# Patient Record
Sex: FEMALE | Employment: OTHER
[De-identification: names, ages, dates, MRNs, and addresses within clinical notes are randomized per-mention and may not be internally consistent; named-entity substitution may affect disease eponyms.]

---

## 2017-05-27 ENCOUNTER — HEALTH MAINTENANCE LETTER (OUTPATIENT)
Age: 57
End: 2017-05-27

## 2017-06-06 ENCOUNTER — DOCUMENTATION ONLY (OUTPATIENT)
Dept: LAB | Facility: CLINIC | Age: 57
End: 2017-06-06

## 2017-06-06 DIAGNOSIS — C50.919 BREAST CANCER (H): Primary | ICD-10-CM

## 2017-06-06 NOTE — PROGRESS NOTES
Patient is scheduled for a lab appointment on 6/14/17.  Please place future orders for labs needed.  Thank you.

## 2017-06-16 DIAGNOSIS — C50.919 BREAST CANCER (H): ICD-10-CM

## 2017-06-16 LAB
ALBUMIN SERPL-MCNC: 4.2 G/DL (ref 3.4–5)
ALP SERPL-CCNC: 59 U/L (ref 40–150)
ALT SERPL W P-5'-P-CCNC: 18 U/L (ref 0–50)
ANION GAP SERPL CALCULATED.3IONS-SCNC: 6 MMOL/L (ref 3–14)
AST SERPL W P-5'-P-CCNC: 25 U/L (ref 0–45)
BASOPHILS # BLD AUTO: 0 10E9/L (ref 0–0.2)
BASOPHILS NFR BLD AUTO: 0.2 %
BILIRUB SERPL-MCNC: 0.4 MG/DL (ref 0.2–1.3)
BUN SERPL-MCNC: 10 MG/DL (ref 7–30)
CALCIUM SERPL-MCNC: 9.7 MG/DL (ref 8.5–10.1)
CHLORIDE SERPL-SCNC: 101 MMOL/L (ref 94–109)
CO2 SERPL-SCNC: 28 MMOL/L (ref 20–32)
CREAT SERPL-MCNC: 0.8 MG/DL (ref 0.52–1.04)
DIFFERENTIAL METHOD BLD: ABNORMAL
EOSINOPHIL # BLD AUTO: 0.1 10E9/L (ref 0–0.7)
EOSINOPHIL NFR BLD AUTO: 2 %
ERYTHROCYTE [DISTWIDTH] IN BLOOD BY AUTOMATED COUNT: 12.5 % (ref 10–15)
GFR SERPL CREATININE-BSD FRML MDRD: 73 ML/MIN/1.7M2
GLUCOSE SERPL-MCNC: 103 MG/DL (ref 70–99)
HCT VFR BLD AUTO: 34.8 % (ref 35–47)
HGB BLD-MCNC: 11.7 G/DL (ref 11.7–15.7)
LYMPHOCYTES # BLD AUTO: 1.7 10E9/L (ref 0.8–5.3)
LYMPHOCYTES NFR BLD AUTO: 33.1 %
MCH RBC QN AUTO: 34.2 PG (ref 26.5–33)
MCHC RBC AUTO-ENTMCNC: 33.6 G/DL (ref 31.5–36.5)
MCV RBC AUTO: 102 FL (ref 78–100)
MONOCYTES # BLD AUTO: 0.9 10E9/L (ref 0–1.3)
MONOCYTES NFR BLD AUTO: 18.6 %
NEUTROPHILS # BLD AUTO: 2.3 10E9/L (ref 1.6–8.3)
NEUTROPHILS NFR BLD AUTO: 46.1 %
PLATELET # BLD AUTO: 290 10E9/L (ref 150–450)
POTASSIUM SERPL-SCNC: 4.3 MMOL/L (ref 3.4–5.3)
PROT SERPL-MCNC: 7.9 G/DL (ref 6.8–8.8)
RBC # BLD AUTO: 3.42 10E12/L (ref 3.8–5.2)
SODIUM SERPL-SCNC: 135 MMOL/L (ref 133–144)
WBC # BLD AUTO: 5 10E9/L (ref 4–11)

## 2017-06-16 PROCEDURE — 80053 COMPREHEN METABOLIC PANEL: CPT | Performed by: INTERNAL MEDICINE

## 2017-06-16 PROCEDURE — 86300 IMMUNOASSAY TUMOR CA 15-3: CPT | Performed by: INTERNAL MEDICINE

## 2017-06-16 PROCEDURE — 85025 COMPLETE CBC W/AUTO DIFF WBC: CPT | Performed by: INTERNAL MEDICINE

## 2017-06-16 PROCEDURE — 36415 COLL VENOUS BLD VENIPUNCTURE: CPT | Performed by: INTERNAL MEDICINE

## 2017-06-18 LAB — CANCER AG27-29 SERPL-ACNC: 21 U/ML (ref 0–39)

## 2017-06-22 ENCOUNTER — TRANSFERRED RECORDS (OUTPATIENT)
Dept: HEALTH INFORMATION MANAGEMENT | Facility: CLINIC | Age: 57
End: 2017-06-22

## 2017-07-05 ENCOUNTER — ONCOLOGY VISIT (OUTPATIENT)
Dept: ONCOLOGY | Facility: CLINIC | Age: 57
End: 2017-07-05
Attending: INTERNAL MEDICINE

## 2017-07-05 VITALS
BODY MASS INDEX: 22.35 KG/M2 | SYSTOLIC BLOOD PRESSURE: 128 MMHG | OXYGEN SATURATION: 97 % | HEART RATE: 103 BPM | DIASTOLIC BLOOD PRESSURE: 82 MMHG | TEMPERATURE: 99.4 F | RESPIRATION RATE: 18 BRPM | WEIGHT: 142.4 LBS | HEIGHT: 67 IN

## 2017-07-05 DIAGNOSIS — Z85.3 PERSONAL HISTORY OF MALIGNANT NEOPLASM OF BREAST: ICD-10-CM

## 2017-07-05 DIAGNOSIS — F17.200 TOBACCO USE DISORDER: Primary | ICD-10-CM

## 2017-07-05 DIAGNOSIS — Z78.0 MENOPAUSE: ICD-10-CM

## 2017-07-05 DIAGNOSIS — D75.89 MACROCYTOSIS: ICD-10-CM

## 2017-07-05 PROCEDURE — 99211 OFF/OP EST MAY X REQ PHY/QHP: CPT

## 2017-07-05 PROCEDURE — 99214 OFFICE O/P EST MOD 30 MIN: CPT | Performed by: INTERNAL MEDICINE

## 2017-07-05 RX ORDER — ANASTROZOLE 1 MG/1
1 TABLET ORAL DAILY
Qty: 90 TABLET | Refills: 3 | Status: SHIPPED | OUTPATIENT
Start: 2017-07-05 | End: 2018-07-19

## 2017-07-05 RX ORDER — CITALOPRAM HYDROBROMIDE 20 MG/1
20 TABLET ORAL
COMMUNITY
Start: 2017-03-30

## 2017-07-05 ASSESSMENT — PAIN SCALES - GENERAL: PAINLEVEL: NO PAIN (0)

## 2017-07-05 NOTE — PATIENT INSTRUCTIONS
Dr. Vickers would like you to have a Dexa scan and we would like to see you back in 6 months for a follow up with labs prior. Your medication have been sent to [facility]- - [address]  When you are in need of a refill, please call your pharmacy and they will send us a request.  Copy of appointments, and after visit summary (AVS) given to patient.  If you have any questions please call Vicky Wells RN, BSN Oncology Hematology  [facility] [phone]. For questions after business hours, or on holidays/weekends, please call our after hours Nurse Triage line [phone]. Thank you.           Due Dexa now.   6 months f/u with labs

## 2017-07-05 NOTE — PROGRESS NOTES
CHIEF COMPLAINT AND REASON FOR VISIT: left breast cancer , [id number] with RS of 56, s/p adjuvant DD AC, s/p RT, now on Arimidex since 2015     HISTORY OF PRESENT ILLNESS:  Gladis Wolfe is a 54-year-old postmenopausal woman diagnosed through screening mammogram with left breast infiltrating ductal cancer in 2014.  Mammogram found a 1 cm density with cluster of irregular calcification in the left breast upper lateral quatrant which is new.  Biopsy was done in mid November through ultrasound.  She subsequently underwent left lumpectomy with Dr. Davison at [facility] at the end of 2014, found infiltrating ductal cancer, grade 3 of 0.8 cm, ER 81% positive, CA 14% positive, HER-2/nancy negative, associated with grade 3 DCIS.  One sentinel node was negative.  She has pathologic T1b N0 disease.   She did not have any breast complaints.  She missed 1-year mammogram in .  Baseline mammogram in  was negative.     Staging in 2014 found no distal disease. She has F7aV6B0 disease.  RS back at 55 is very high.     DD AC x4 was advised in adjuvant setting. She made informed decision to proceed from  2015 to 3/2015. She finished RT in 2015.  She is advised on arimidex 2015.      PAST MEDICAL HISTORY:  Hypothyroidism, depression, high cholesterol.  Last menstrual period around .      MEDICATIONS:  Reviewed in Epic system.      SOCIAL HISTORY:  She lives in [place].  Her primary care physician is Dr. Leana Santamaria from [facility].  She is an active smoker, less than a pack a day for 40 years. She is back to work as . She drinks ETOH daily.      FAMILY HISTORY:  Positive for Dad, who had tongue cancer and had metastatic disease,  from that, and Mom probably has had some type of cancer.  She also had dementia.      REVIEW OF SYSTEMS:   Good appetite. Weight is up. 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   She is s/p lympedema therapy.  She continues to smoke and daily ETOH.  "      PHYSICAL EXAMINATION:   VITAL SIGNS:  Blood pressure 128/82, pulse 103, temperature 99.4  F (37.4  C), temperature source Tympanic, resp. rate 18, height 1.708 m (5' 7.25\"), weight 64.6 kg (142 lb 6.4 oz), SpO2 97 %, not currently breastfeeding.  GENERAL APPEARANCE:   Middle-aged woman, very pleasant, not in acute distress, tobacco smell.   HEENT: The patient is normocephalic, atraumatic. Pupils are equally reactive to light.  Sclerae are anicteric.  Moist oral mucosa.  Negative pharynx.  No oral thrush.   NECK:  Supple.  No jugular venous distention.  Thyroid is not palpable.   LYMPH NODES:  Superficial lymphadenopathy is not appreciable in the bilateral cervical, supraclavicular, axillary or inguinal areas.   CARDIOVASCULAR:  S1, S2 regular with no murmurs or gallops.  No carotid or abdominal bruits.   PULMONARY:  Lungs are clear to auscultation and percussion bilaterally.  There is no wheezing or rhonchi.   GASTROINTESTINAL:  Abdomen is soft, nontender.  No hepatosplenomegaly.  No signs of ascites.  No mass appreciable.   MUSCULOSKELETAL/EXTREMITIES:  No edema.  No cyanotic changes.  No signs of joint deformity.  No lymphedema.   NEUROLOGIC:  Cranial nerves II-XII are grossly intact.  Sensation intact.  Muscle strength and muscle tone symmetrical, 5/5 throughout.   BACK:  No spinal or paraspinal tenderness.  No CVA tenderness.   SKIN:  No petechiae.  No rash.  No signs of cellulitis.   BREASTS:  She has well-healed left lumpectomy scar. Left breast with fibrosed skin and protruded much comparing to right side due to lympedema.  She has fibrotic changes around the sentinel scar in her left axilla.  Right breast feels smooth and no skin changes.       Current LAB Data reviewed  6/2017 wbc 5.0, hb 11.7, , PL nl.       Current Imaging Reviewed  B/l MA 6/2017 at [facility] [place]: negative.       Old data reviewed with summary  12/2016 hb 11.5, , wbc/diff/PL are fine, cmp/marker are fine.   5/2016 hb 10.8, MCV " 104, wbc/diff/pl are nl, cmp, marker are fine    9/2015 hb 9.3, , wbc nl, pl nl, albumin 3.0 from 1.8 in 7/2015, K nl.   7/2015 HB 7.2, mcv 112, WBC 3.8, lymphopenia, PL nl, folate/B12/MMA nl, K 3.4, Mag nl.  Smear 7/2015: Anisocytosis is markedly increased with mild poikilocytosis, the latter consisting of scattered ovalocytes. Minimal polychromasia is present  and not increased. No red cell fragments, dacryocytes, or circulating nucleated red blood cell precursors are present to suspect a myelophthisic related anemic process.       RS at 55 in 12/2014.   12/2104 CBC/marker are fine.   In 10/2014 liver function tests from [facility] system are normal.      Lumpectomy at the end of 11/2014:  Infiltrating ductal cancer from the left breast, grade 3, associated high-grade DCIS, 0.8 cm.  Angiolymphatic invasion is absent.  ER 81% positive, NM 14% positive, HER-2/nancy negative.      Echo 12/2014 EF 64%  Dexa 12/2014: nl.  Cxr/bone scan 12/2014: negative  Diagnostic screening mammogram in 10/2014 found to have new cluster of microcalcification, 1.0 cm in the left breast, is new.        ASSESSMENT AND PLAN:    1.  pathologic [id number] early stage high-grade infiltrating ductal cancer with grade ductal carcinoma in situ, status post lumpectomy, 1 lymph node was negative, ER positive 85%, NM positive 14%, HER-2/nancy negative, status post lumpectomy. RS at 56 belong to very high risk.      Due to her high recurrence score of 56, recommended DD AC x 4.  She made informed decision to proceed. She finished it in 3/2015 and had RT 4/2015.     Arimidex is advised in 4/2015. We talked about the side effects and how she should be monitored during this intervention.      She is due 6 months f/u with labs. MA due in Feb.     2. Smoker. Smoking cessation is advised.     3. Persistent macrocytosis, without evidence of nutrition deficiency.   She is slowing improving. She is advised to cut down ETOH use.

## 2017-07-05 NOTE — NURSING NOTE
"Oncology Rooming Note    July 5, 2017 2:19 PM   Gladis Wolfe is a 57 year old female who presents for:    Chief Complaint   Patient presents with     Oncology Clinic Visit     6 month recheck Breast CA, review labs & Mammogram      Initial Vitals: /82 (BP Location: Right arm, Patient Position: Sitting, Cuff Size: Adult Regular)  Pulse 103  Temp 99.4  F (37.4  C) (Tympanic)  Resp 18  Ht 1.708 m (5' 7.25\")  Wt 64.6 kg (142 lb 6.4 oz)  LMP  (Within Years)  SpO2 97%  Breastfeeding? No  BMI 22.14 kg/m2 Estimated body mass index is 22.14 kg/(m^2) as calculated from the following:    Height as of this encounter: 1.708 m (5' 7.25\").    Weight as of this encounter: 64.6 kg (142 lb 6.4 oz). Body surface area is 1.75 meters squared.  No Pain (0) Comment: Data Unavailable   No LMP recorded (within years). Patient is postmenopausal.  Allergies reviewed: Yes  Medications reviewed: Yes    Medications: MEDICATION REFILLS NEEDED TODAY. Provider was notified.  Pharmacy name entered into [employer]: [facility] - [address]    Clinical concerns 6 month recheck Breast CA, review labs & Mammogram.     7 minutes for nursing intake (face to face time)     [place] CMA              "

## 2017-07-05 NOTE — MR AVS SNAPSHOT
After Visit Summary   7/5/2017    Gladis Wolfe    MRN: [id number]           Patient Information     Date Of Birth          1960        Visit Information        Provider Department      7/5/2017 2:00 PM Elis Vickers MD [facility]        Today's Diagnoses     Tobacco use disorder    -  1    Personal history of malignant neoplasm of breast        Macrocytosis        Menopause          Care Instructions    Dr. Vickers would like you to have a Dexa scan and we would like to see you back in 6 months for a follow up with labs prior. Your medication have been sent to [facility]- - [address]  When you are in need of a refill, please call your pharmacy and they will send us a request.  Copy of appointments, and after visit summary (AVS) given to patient.  If you have any questions please call Vicky Wells RN, BSN Oncology Hematology  [facility] [phone]. For questions after business hours, or on holidays/weekends, please call our after hours Nurse Triage line [phone]. Thank you.           Due Dexa now.   6 months f/u with labs          Follow-ups after your visit        Your next 10 appointments already scheduled     Jan 04, 2018  1:00 PM CST   [facility]    [address] [address] [phone]           Patient must bring picture ID.  Patient should be prepared to give a urine specimen  Please do not eat 10-12 hours before your appointment if you are coming in fasting for labs on lipids, cholesterol, or glucose (sugar).  Pregnant women should follow their Care Team instructions. Water with medications is okay. Do not drink coffee or other fluids.   If you have concerns about taking  your medications, please ask at office or if scheduling via [employer], send a message by clicking on Secure Messaging, Message Your  Care Team.            Jan 11, 2018 10:45 AM CST   Return Visit with Elis Vickers MD   [facility] ([facility])    [facility]  [address]  [facility] [id number] [phone]              Future tests that were ordered for you today     Open Future Orders        Priority Expected Expires Ordered    DX Hip/Pelvis/Spine Routine  7/5/2018 7/5/2017    Comprehensive metabolic panel Routine 1/1/2018 2/28/2018 7/5/2017    Ca27.29  breast tumor marker Routine 1/1/2018 2/28/2018 7/5/2017            Who to contact     If you have questions or need follow up information about today's clinic visit or your schedule please contact [facility] directly at [phone].  Normal or non-critical lab and imaging results will be communicated to you by MyChart, letter or phone within 4 business days after the clinic has received the results. If you do not hear from us within 7 days, please contact the clinic through [employer] or phone. If you have a critical or abnormal lab result, we will notify you by phone as soon as possible.  Submit refill requests through [employer] or call your pharmacy and they will forward the refill request to us. Please allow 3 business days for your refill to be completed.          Additional Information About Your Visit        [employer] Information     [employer] gives you secure access to your electronic health record. If you see a primary care provider, you can also send messages to your care team and make appointments. If you have questions, please call your primary care clinic.  If you do not have a primary care provider, please call [phone] and they will assist you.        Care EveryWhere ID     This is your Care EveryWhere ID. This could be used by other organizations to access your [facility] medical records  [id number]        Your Vitals Were     Pulse Temperature Respirations Height Last Period Pulse Oximetry    103 99.4  F (37.4  C)  "(Tympanic) 18 1.708 m (5' 7.25\") (Within Years) 97%    Breastfeeding? BMI (Body Mass Index)                No 22.14 kg/m2           Blood Pressure from Last 3 Encounters:   07/05/17 128/82   12/20/16 142/71   06/15/16 143/75    Weight from Last 3 Encounters:   07/05/17 64.6 kg (142 lb 6.4 oz)   12/20/16 68 kg (149 lb 14.4 oz)   06/15/16 67.1 kg (148 lb)                 Today's Medication Changes          These changes are accurate as of: 7/5/17  3:00 PM.  If you have any questions, ask your nurse or doctor.               These medicines have changed or have updated prescriptions.        Dose/Directions    citalopram 20 MG tablet   Commonly known as:  celeXA   This may have changed:  Another medication with the same name was removed. Continue taking this medication, and follow the directions you see here.   Used for:  Personal history of malignant neoplasm of breast, Tobacco use disorder, Macrocytosis   Changed by:  Elis Vickers MD        Dose:  20 mg   Take 20 mg by mouth   Refills:  0            Where to get your medicines      These medications were sent to [facility] [id number]     Phone:  [phone]     anastrozole 1 MG tablet                Primary Care Provider Office Phone # Fax #    Leana Santamaria [phone]       [place] [id number]        Equal Access to Services     ISAAC OBRIEN AH: Andrez dwyero Soomaali, waaxda luqadaha, qaybta kaalmada adeegyada, waxay kumar walker. So [facility] [phone].    ATENCIÓN: Si habla deidre, tiene a del toro disposición servicios gratuitos de asistencia lingüística. Llame al [phone].    We comply with applicable federal civil rights laws and [place] laws. We do not discriminate on the basis of race, color, national origin, age, disability sex, sexual orientation or gender identity.            Thank you!     " Thank you for choosing [facility]  for your care. Our goal is always to provide you with excellent care. Hearing back from our patients is one way we can continue to improve our services. Please take a few minutes to complete the written survey that you may receive in the mail after your visit with us. Thank you!             Your Updated Medication List - Protect others around you: Learn how to safely use, store and throw away your medicines at www.disposemymeds.org.          This list is accurate as of: 7/5/17  3:00 PM.  Always use your most recent med list.                   Brand Name Dispense Instructions for use Diagnosis    anastrozole 1 MG tablet    ARIMIDEX    90 tablet    Take 1 tablet (1 mg) by mouth daily    Personal history of malignant neoplasm of breast, Tobacco use disorder, Macrocytosis       Calcium-Vitamin D 600-200 MG-UNIT Tabs       Breast cancer, left (H)       citalopram 20 MG tablet    celeXA     Take 20 mg by mouth    Personal history of malignant neoplasm of breast, Tobacco use disorder, Macrocytosis       levothyroxine 75 MCG tablet    SYNTHROID/LEVOTHROID     Take 75 mcg by mouth    Breast cancer, left (H)       MULTI-VITAMINS Tabs       Breast cancer, left (H)       OMEGA-3 FISH OIL PO      Take 1 g by mouth daily    Breast cancer, left (H)       potassium chloride 10 MEQ tablet    K-TAB,KLOR-CON    120 tablet    Take 2 tablets (20 mEq) by mouth 2 times daily    Hypokalemia       pravastatin 20 MG tablet    PRAVACHOL     Take 20 mg by mouth    Breast cancer, left (H)       saccharomyces boulardii 250 MG capsule    FLORASTOR     Take 250 mg by mouth 2 times daily    Breast cancer, left (H)       vitamin B complex with vitamin C Tabs tablet      Take 1 tablet by mouth daily

## 2018-01-12 DIAGNOSIS — F17.200 TOBACCO USE DISORDER: ICD-10-CM

## 2018-01-12 DIAGNOSIS — D75.89 MACROCYTOSIS: ICD-10-CM

## 2018-01-12 DIAGNOSIS — Z85.3 PERSONAL HISTORY OF MALIGNANT NEOPLASM OF BREAST: ICD-10-CM

## 2018-01-12 PROCEDURE — 86300 IMMUNOASSAY TUMOR CA 15-3: CPT | Performed by: INTERNAL MEDICINE

## 2018-01-12 PROCEDURE — 36415 COLL VENOUS BLD VENIPUNCTURE: CPT | Performed by: INTERNAL MEDICINE

## 2018-01-12 PROCEDURE — 80053 COMPREHEN METABOLIC PANEL: CPT | Performed by: INTERNAL MEDICINE

## 2018-01-13 LAB
ALBUMIN SERPL-MCNC: 4.4 G/DL (ref 3.4–5)
ALP SERPL-CCNC: 62 U/L (ref 40–150)
ALT SERPL W P-5'-P-CCNC: 24 U/L (ref 0–50)
ANION GAP SERPL CALCULATED.3IONS-SCNC: 8 MMOL/L (ref 3–14)
AST SERPL W P-5'-P-CCNC: 40 U/L (ref 0–45)
BILIRUB SERPL-MCNC: 0.4 MG/DL (ref 0.2–1.3)
BUN SERPL-MCNC: 12 MG/DL (ref 7–30)
CALCIUM SERPL-MCNC: 9.4 MG/DL (ref 8.5–10.1)
CANCER AG27-29 SERPL-ACNC: 20 U/ML (ref 0–39)
CHLORIDE SERPL-SCNC: 103 MMOL/L (ref 94–109)
CO2 SERPL-SCNC: 24 MMOL/L (ref 20–32)
CREAT SERPL-MCNC: 0.9 MG/DL (ref 0.52–1.04)
GFR SERPL CREATININE-BSD FRML MDRD: 64 ML/MIN/1.7M2
GLUCOSE SERPL-MCNC: 110 MG/DL (ref 70–99)
POTASSIUM SERPL-SCNC: 4.1 MMOL/L (ref 3.4–5.3)
PROT SERPL-MCNC: 8 G/DL (ref 6.8–8.8)
SODIUM SERPL-SCNC: 135 MMOL/L (ref 133–144)

## 2018-01-18 ENCOUNTER — ONCOLOGY VISIT (OUTPATIENT)
Dept: ONCOLOGY | Facility: CLINIC | Age: 58
End: 2018-01-18
Attending: INTERNAL MEDICINE
Payer: COMMERCIAL

## 2018-01-18 VITALS
WEIGHT: 145.2 LBS | RESPIRATION RATE: 18 BRPM | HEIGHT: 68 IN | SYSTOLIC BLOOD PRESSURE: 152 MMHG | HEART RATE: 87 BPM | OXYGEN SATURATION: 96 % | DIASTOLIC BLOOD PRESSURE: 48 MMHG | TEMPERATURE: 98.2 F | BODY MASS INDEX: 22.01 KG/M2

## 2018-01-18 DIAGNOSIS — F17.200 TOBACCO USE DISORDER: ICD-10-CM

## 2018-01-18 DIAGNOSIS — D75.89 MACROCYTOSIS: ICD-10-CM

## 2018-01-18 DIAGNOSIS — Z85.3 PERSONAL HISTORY OF MALIGNANT NEOPLASM OF BREAST: Primary | ICD-10-CM

## 2018-01-18 PROCEDURE — G0463 HOSPITAL OUTPT CLINIC VISIT: HCPCS

## 2018-01-18 PROCEDURE — 99214 OFFICE O/P EST MOD 30 MIN: CPT | Performed by: INTERNAL MEDICINE

## 2018-01-18 ASSESSMENT — PAIN SCALES - GENERAL: PAINLEVEL: NO PAIN (0)

## 2018-01-18 NOTE — PATIENT INSTRUCTIONS
Dr. Vickers would like you to have a Dexa scan and a Mammogram in June. We would like to see you back in 6 months for a follow up appointment with labs prior. When you are in need of a refill, please call your pharmacy and they will send us a request.  Copy of appointments, and after visit summary (AVS) given to patient.  If you have any questions please call Vicky Wells RN, BSN Oncology Hematology  [facility] [phone]. For questions after business hours, or on holidays/weekends, please call our after hours Nurse Triage line [phone]. Thank you.             dexa to be done, MA due in June. 6 months f/u with labs

## 2018-01-18 NOTE — NURSING NOTE
"Oncology Rooming Note    January 18, 2018 11:54 AM   Gladis Wolfe is a 57 year old female who presents for:    Chief Complaint   Patient presents with     Oncology Clinic Visit     6 month follow up breast CA. Review lab and Mammogram results.      Initial Vitals: /48 (BP Location: Right arm, Patient Position: Sitting, Cuff Size: Adult Regular)  Pulse 87  Temp 98.2  F (36.8  C) (Tympanic)  Resp 18  Ht 1.715 m (5' 7.5\")  Wt 65.9 kg (145 lb 3.2 oz)  LMP  (Within Years)  SpO2 96%  Breastfeeding? No  BMI 22.41 kg/m2 Estimated body mass index is 22.41 kg/(m^2) as calculated from the following:    Height as of this encounter: 1.715 m (5' 7.5\").    Weight as of this encounter: 65.9 kg (145 lb 3.2 oz). Body surface area is 1.77 meters squared.  No Pain (0) Comment: Data Unavailable   No LMP recorded (within years). Patient is postmenopausal.  Allergies reviewed: Yes  Medications reviewed: Yes    Medications: Medication refills not needed today.  Pharmacy name entered into [employer]: [facility]- - [address]    Clinical concerns: 6 month follow up breast CA. Review lab and Mammogram results. No concerns at this time.     7 minutes for nursing intake (face to face time)     Siomara Goode CMA            "

## 2018-01-18 NOTE — LETTER
2018         RE: Gladis Wolfe  [address] [facility] [address]        Dear Colleague,    Thank you for referring your patient, Gladis Wolfe, to the [facility]. Please see a copy of my visit note below.    CHIEF COMPLAINT AND REASON FOR VISIT: left breast cancer , [id number] with RS of 56, s/p adjuvant DD AC, s/p RT, now on Arimidex since 2015     HISTORY OF PRESENT ILLNESS: she was diagnosed through screening mammogram with left breast infiltrating ductal cancer in 2014.  Mammogram found a 1 cm density with cluster of irregular calcification in the left breast upper lateral quatrant which is new.  Biopsy was done in mid November through ultrasound.    She subsequently underwent left lumpectomy with Dr. Davison at [facility] at the end of 2014, found infiltrating ductal cancer, grade 3 of 0.8 cm, ER 81% positive, NE 14% positive, HER-2/nancy negative, associated with grade 3 DCIS.  One sentinel node was negative.  She has pathologic T1b N0 disease.   She did not have any breast complaints.  She missed 1-year mammogram in .  Baseline mammogram in  was negative.     Staging in 2014 found no distal disease. She has B0iA6H4 disease.  RS back at 56 was very high.     DD AC x4 was advised in adjuvant setting. She made informed decision to proceed from  2015 to 3/2015.   She finished RT in 2015.  She is advised on arimidex 2015.      PAST MEDICAL HISTORY:  Hypothyroidism, depression, high cholesterol.  Last menstrual period around .      MEDICATIONS:  Reviewed in Epic system.      SOCIAL HISTORY:  She lives in [place].  Her primary care physician is Dr. Leana Santamaria from [facility].  She is an active smoker, less than a pack a day for 40 years. She is back to work as . She drinks ETOH daily.      FAMILY HISTORY:  Positive for Dad, who had tongue cancer and had metastatic disease,  from that, and Mom probably has had some type of  "cancer.  She also had dementia.      REVIEW OF SYSTEMS:   Good appetite. Weight is up.   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   She is s/p lympedema therapy.  She continues to smoke and daily ETOH /bear 12 oz bottle.       PHYSICAL EXAMINATION:   VITAL SIGNS:  Blood pressure 152/48, pulse 87, temperature 98.2  F (36.8  C), temperature source Tympanic, resp. rate 18, height 1.715 m (5' 7.5\"), weight 65.9 kg (145 lb 3.2 oz), SpO2 96 %, not currently breastfeeding.  GENERAL APPEARANCE:   Middle-aged woman, very pleasant, not in acute distress, tobacco smell.   HEENT: The patient is normocephalic, atraumatic. Pupils are equally reactive to light.  Sclerae are anicteric.  Moist oral mucosa.  Negative pharynx.  No oral thrush.   NECK:  Supple.  No jugular venous distention.  Thyroid is not palpable.   LYMPH NODES:  Superficial lymphadenopathy is not appreciable in the bilateral cervical, supraclavicular, axillary or inguinal areas.   CARDIOVASCULAR:  S1, S2 regular with no murmurs or gallops.  No carotid or abdominal bruits.   PULMONARY:  Lungs are clear to auscultation and percussion bilaterally.  There is no wheezing or rhonchi.   GASTROINTESTINAL:  Abdomen is soft, nontender.  No hepatosplenomegaly.  No signs of ascites.  No mass appreciable.   MUSCULOSKELETAL/EXTREMITIES:  No edema.  No cyanotic changes.  No signs of joint deformity.  No lymphedema.   NEUROLOGIC:  Cranial nerves II-XII are grossly intact.  Sensation intact.  Muscle strength and muscle tone symmetrical, 5/5 throughout.   BACK:  No spinal or paraspinal tenderness.  No CVA tenderness.   SKIN:  No petechiae.  No rash.  No signs of cellulitis.   BREASTS:  She has well-healed left lumpectomy scar. Left breast with fibrosed skin and protruded much comparing to right side due to lympedema.  She has fibrotic changes around the sentinel scar in her left axilla.  Right breast feels smooth and no skin changes.       Current LAB Data " reviewed  CMP/marker are fine.   6/2017 wbc 5.0, hb 11.7, , PL nl.       Current Imaging Reviewed  B/l MA 6/2017 at [facility]: negative.       Old data reviewed with summary  12/2016 hb 11.5, , wbc/diff/PL are fine, cmp/marker are fine.   5/2016 hb 10.8, , wbc/diff/pl are nl, cmp, marker are fine    9/2015 hb 9.3, , wbc nl, pl nl, albumin 3.0 from 1.8 in 7/2015, K nl.   7/2015 HB 7.2, mcv 112, WBC 3.8, lymphopenia, PL nl, folate/B12/MMA nl, K 3.4, Mag nl.  Smear 7/2015: Anisocytosis is markedly increased with mild poikilocytosis, the latter consisting of scattered ovalocytes. Minimal polychromasia is present  and not increased. No red cell fragments, dacryocytes, or circulating nucleated red blood cell precursors are present to suspect a myelophthisic related anemic process.       RS at 55 in 12/2014.   12/2104 CBC/marker are fine.   In 10/2014 liver function tests from [facility] system are normal.      Lumpectomy at the end of 11/2014:  Infiltrating ductal cancer from the left breast, grade 3, associated high-grade DCIS, 0.8 cm.  Angiolymphatic invasion is absent.  ER 81% positive, NJ 14% positive, HER-2/nancy negative.      Echo 12/2014 EF 64%  Dexa 12/2014: nl.  Cxr/bone scan 12/2014: negative  Diagnostic screening mammogram in 10/2014 found to have new cluster of microcalcification, 1.0 cm in the left breast, is new.        ASSESSMENT AND PLAN:    1.  pathologic [id number] early stage high-grade infiltrating ductal cancer with grade ductal carcinoma in situ, status post lumpectomy, 1 lymph node was negative, ER positive 85%, NJ positive 14%, HER-2/nancy negative.   Due to her high recurrence score of 56, recommended DD AC x 4.  She made informed decision to proceed. She finished it in 3/2015 and had RT 4/2015.     Arimidex is advised in 4/2015. We talked about the side effects and how she should be monitored during this intervention.      She is due 6 months f/u with labs. MA due in Feb.   She is  overdue Dexa.     2. Smoker. Smoking cessation is advised. She is not willing to quit.     3. Persistent macrocytosis, without evidence of nutrition deficiency.   She is slowing improving. She is advised to cut down ETOH use.       Again, thank you for allowing me to participate in the care of your patient.        Sincerely,        Elis Vickers MD, MD

## 2018-01-18 NOTE — MR AVS SNAPSHOT
After Visit Summary   1/18/2018    Gladis Wolfe    MRN: [id number]           Patient Information     Date Of Birth          1960        Visit Information        Provider Department      1/18/2018 11:45 AM Elis Vickers MD [facility]        Today's Diagnoses     Personal history of malignant neoplasm of breast    -  1    Tobacco use disorder        Macrocytosis          Care Instructions    Dr. Vickers would like you to have a Dexa scan and a Mammogram in June. We would like to see you back in 6 months for a follow up appointment with labs prior. When you are in need of a refill, please call your pharmacy and they will send us a request.  Copy of appointments, and after visit summary (AVS) given to patient.  If you have any questions please call Vicky Wells RN, BSN Oncology Hematology  [facility] [phone]. For questions after business hours, or on holidays/weekends, please call our after hours Nurse Triage line [phone]. Thank you.             dexa to be done, MA due in June. 6 months f/u with labs          Follow-ups after your visit        Your next 10 appointments already scheduled     Jan 29, 2018 11:30 AM CST   DX HIP/PELVIS/SPINE with WYDX1   [facility] Dexa ([facility])    [address] [address] [phone]           Please do not take any of the following 24 hours prior to the day of your exam: vitamins, calcium tablets, antacids.  If possible, please wear clothes without metal (snaps, zippers). A sweatsuit works well.            Jul 12, 2018  1:00 PM CDT   LAB with [facility]   [facility]    [address] [id number] [phone]           Please do not eat 10-12 hours before your appointment if you are coming in fasting for labs on lipids, cholesterol, or glucose (sugar). This does not apply to pregnant women. Water, hot  tea and black coffee (with nothing added) are okay. Do not drink other fluids, diet soda or chew gum.            Jul 19, 2018 11:45 AM CDT   Return Visit with Elis Vickers MD   [facility] ([facility])    [facility]  [address]  [facility] [address]   [phone]              Future tests that were ordered for you today     Open Future Orders        Priority Expected Expires Ordered    Ca27.29  breast tumor marker Routine 6/1/2018 8/31/2018 1/18/2018    CBC with platelets differential Routine 6/1/2018 8/31/2018 1/18/2018    Comprehensive metabolic panel Routine 6/1/2018 8/31/2018 1/18/2018    MA Screen Bilateral w/Warren Routine 6/1/2018 8/31/2018 1/18/2018            Who to contact     If you have questions or need follow up information about today's clinic visit or your schedule please contact [facility] directly at [phone].  Normal or non-critical lab and imaging results will be communicated to you by [employer], letter or phone within 4 business days after the clinic has received the results. If you do not hear from us within 7 days, please contact the clinic through [employer] or phone. If you have a critical or abnormal lab result, we will notify you by phone as soon as possible.  Submit refill requests through [employer] or call your pharmacy and they will forward the refill request to us. Please allow 3 business days for your refill to be completed.          Additional Information About Your Visit        Cloud[employer] Information     [employer] gives you secure access to your electronic health record. If you see a primary care provider, you can also send messages to your care team and make appointments. If you have questions, please call your primary care clinic.  If you do not have a primary care provider, please call [phone] and they will assist you.        Care EveryWhere ID     This is your Care EveryWhere ID. This could be used by other  "organizations to access your [facility] medical records  [id number]        Your Vitals Were     Pulse Temperature Respirations Height Last Period Pulse Oximetry    87 98.2  F (36.8  C) (Tympanic) 18 1.715 m (5' 7.5\") (Within Years) 96%    Breastfeeding? BMI (Body Mass Index)                No 22.41 kg/m2           Blood Pressure from Last 3 Encounters:   01/18/18 152/48   07/05/17 128/82   12/20/16 142/71    Weight from Last 3 Encounters:   01/18/18 65.9 kg (145 lb 3.2 oz)   07/05/17 64.6 kg (142 lb 6.4 oz)   12/20/16 68 kg (149 lb 14.4 oz)               Primary Care Provider Office Phone # Fax #    Leana Santamaria [phone]       [facility] [address] [place] [id number]        Equal Access to Services     JAD OBRIEN : Hadii aad ku hadasho Sogen, waaxda luqadaha, qaybta kaalmada adeegyada, tangela chaves . So [facility] [phone].    ATENCIÓN: Si habla español, tiene a del toro disposición servicios gratuitos de asistencia lingüística. David al [phone].    We comply with applicable federal civil rights laws and [place] laws. We do not discriminate on the basis of race, color, national origin, age, disability, sex, sexual orientation, or gender identity.            Thank you!     Thank you for choosing [facility]  for your care. Our goal is always to provide you with excellent care. Hearing back from our patients is one way we can continue to improve our services. Please take a few minutes to complete the written survey that you may receive in the mail after your visit with us. Thank you!             Your Updated Medication List - Protect others around you: Learn how to safely use, store and throw away your medicines at www.disposemymeds.org.          This list is accurate as of: 1/18/18 12:28 PM.  Always use your most recent med list.                   Brand Name Dispense Instructions for use Diagnosis    anastrozole 1 MG tablet    ARIMIDEX    90 tablet "    Take 1 tablet (1 mg) by mouth daily    Personal history of malignant neoplasm of breast, Tobacco use disorder, Macrocytosis       Calcium-Vitamin D 600-200 MG-UNIT Tabs       Breast cancer, left (H)       citalopram 20 MG tablet    celeXA     Take 20 mg by mouth    Personal history of malignant neoplasm of breast, Tobacco use disorder, Macrocytosis       levothyroxine 75 MCG tablet    SYNTHROID/LEVOTHROID     Take 75 mcg by mouth    Breast cancer, left (H)       MULTI-VITAMINS Tabs       Breast cancer, left (H)       OMEGA-3 FISH OIL PO      Take 1 g by mouth daily    Breast cancer, left (H)       potassium chloride 10 MEQ tablet    K-TAB,KLOR-CON    120 tablet    Take 2 tablets (20 mEq) by mouth 2 times daily    Hypokalemia       pravastatin 20 MG tablet    PRAVACHOL     Take 20 mg by mouth    Breast cancer, left (H)       saccharomyces boulardii 250 MG capsule    FLORASTOR     Take 250 mg by mouth 2 times daily    Breast cancer, left (H)       vitamin B complex with vitamin C Tabs tablet      Take 1 tablet by mouth daily

## 2018-01-18 NOTE — PROGRESS NOTES
CHIEF COMPLAINT AND REASON FOR VISIT: left breast cancer , [id number] with RS of 56, s/p adjuvant DD AC, s/p RT, now on Arimidex since 2015     HISTORY OF PRESENT ILLNESS: she was diagnosed through screening mammogram with left breast infiltrating ductal cancer in 2014.  Mammogram found a 1 cm density with cluster of irregular calcification in the left breast upper lateral quatrant which is new.  Biopsy was done in mid November through ultrasound.    She subsequently underwent left lumpectomy with Dr. Davison at [facility] at the end of 2014, found infiltrating ductal cancer, grade 3 of 0.8 cm, ER 81% positive, MI 14% positive, HER-2/nancy negative, associated with grade 3 DCIS.  One sentinel node was negative.  She has pathologic T1b N0 disease.   She did not have any breast complaints.  She missed 1-year mammogram in .  Baseline mammogram in  was negative.     Staging in 2014 found no distal disease. She has D6nJ6A9 disease.  RS back at 55 was very high.     DD AC x4 was advised in adjuvant setting. She made informed decision to proceed from  2015 to 3/2015.   She finished RT in 2015.  She is advised on arimidex 2015.      PAST MEDICAL HISTORY:  Hypothyroidism, depression, high cholesterol.  Last menstrual period around .      MEDICATIONS:  Reviewed in Epic system.      SOCIAL HISTORY:  She lives in [place].  Her primary care physician is Dr. Leana Santamaria from [facility].  She is an active smoker, less than a pack a day for 40 years. She is back to work as . She drinks ETOH daily.      FAMILY HISTORY:  Positive for Dad, who had tongue cancer and had metastatic disease,  from that, and Mom probably has had some type of cancer.  She also had dementia.      REVIEW OF SYSTEMS:   Good appetite. Weight is up.   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   She is s/p lympedema therapy.  She continues to smoke and daily ETOH /bear 12 oz bottle.       PHYSICAL  "EXAMINATION:   VITAL SIGNS:  Blood pressure 152/48, pulse 87, temperature 98.2  F (36.8  C), temperature source Tympanic, resp. rate 18, height 1.715 m (5' 7.5\"), weight 65.9 kg (145 lb 3.2 oz), SpO2 96 %, not currently breastfeeding.  GENERAL APPEARANCE:   Middle-aged woman, very pleasant, not in acute distress, tobacco smell.   HEENT: The patient is normocephalic, atraumatic. Pupils are equally reactive to light.  Sclerae are anicteric.  Moist oral mucosa.  Negative pharynx.  No oral thrush.   NECK:  Supple.  No jugular venous distention.  Thyroid is not palpable.   LYMPH NODES:  Superficial lymphadenopathy is not appreciable in the bilateral cervical, supraclavicular, axillary or inguinal areas.   CARDIOVASCULAR:  S1, S2 regular with no murmurs or gallops.  No carotid or abdominal bruits.   PULMONARY:  Lungs are clear to auscultation and percussion bilaterally.  There is no wheezing or rhonchi.   GASTROINTESTINAL:  Abdomen is soft, nontender.  No hepatosplenomegaly.  No signs of ascites.  No mass appreciable.   MUSCULOSKELETAL/EXTREMITIES:  No edema.  No cyanotic changes.  No signs of joint deformity.  No lymphedema.   NEUROLOGIC:  Cranial nerves II-XII are grossly intact.  Sensation intact.  Muscle strength and muscle tone symmetrical, 5/5 throughout.   BACK:  No spinal or paraspinal tenderness.  No CVA tenderness.   SKIN:  No petechiae.  No rash.  No signs of cellulitis.   BREASTS:  She has well-healed left lumpectomy scar. Left breast with fibrosed skin and protruded much comparing to right side due to lympedema.  She has fibrotic changes around the sentinel scar in her left axilla.  Right breast feels smooth and no skin changes.       Current LAB Data reviewed  CMP/marker are fine.   6/2017 wbc 5.0, hb 11.7, , PL nl.       Current Imaging Reviewed  B/l MA 6/2017 at [facility]: negative.       Old data reviewed with summary  12/2016 hb 11.5, , wbc/diff/PL are fine, cmp/marker are fine.   5/2016 hb " 10.8, , wbc/diff/pl are nl, cmp, marker are fine    9/2015 hb 9.3, , wbc nl, pl nl, albumin 3.0 from 1.8 in 7/2015, K nl.   7/2015 HB 7.2, mcv 112, WBC 3.8, lymphopenia, PL nl, folate/B12/MMA nl, K 3.4, Mag nl.  Smear 7/2015: Anisocytosis is markedly increased with mild poikilocytosis, the latter consisting of scattered ovalocytes. Minimal polychromasia is present  and not increased. No red cell fragments, dacryocytes, or circulating nucleated red blood cell precursors are present to suspect a myelophthisic related anemic process.       RS at 55 in 12/2014.   12/2104 CBC/marker are fine.   In 10/2014 liver function tests from [facility] system are normal.      Lumpectomy at the end of 11/2014:  Infiltrating ductal cancer from the left breast, grade 3, associated high-grade DCIS, 0.8 cm.  Angiolymphatic invasion is absent.  ER 81% positive, VT 14% positive, HER-2/nancy negative.      Echo 12/2014 EF 64%  Dexa 12/2014: nl.  Cxr/bone scan 12/2014: negative  Diagnostic screening mammogram in 10/2014 found to have new cluster of microcalcification, 1.0 cm in the left breast, is new.        ASSESSMENT AND PLAN:    1.  pathologic [id number] early stage high-grade infiltrating ductal cancer with grade ductal carcinoma in situ, status post lumpectomy, 1 lymph node was negative, ER positive 85%, VT positive 14%, HER-2/nancy negative.   Due to her high recurrence score of 56, recommended DD AC x 4.  She made informed decision to proceed. She finished it in 3/2015 and had RT 4/2015.     Arimidex is advised in 4/2015. We talked about the side effects and how she should be monitored during this intervention.      She is due 6 months f/u with labs. MA due in Feb.   She is overdue Dexa.     2. Smoker. Smoking cessation is advised. She is not willing to quit.     3. Persistent macrocytosis, without evidence of nutrition deficiency.   She is slowing improving. She is advised to cut down ETOH use.

## 2018-01-29 ENCOUNTER — HOSPITAL ENCOUNTER (OUTPATIENT)
Dept: BONE DENSITY | Facility: CLINIC | Age: 58
Discharge: HOME | End: 2018-01-29
Attending: INTERNAL MEDICINE | Admitting: INTERNAL MEDICINE
Payer: COMMERCIAL

## 2018-01-29 DIAGNOSIS — Z78.0 MENOPAUSE: ICD-10-CM

## 2018-01-29 PROCEDURE — 77080 DXA BONE DENSITY AXIAL: CPT

## 2018-02-01 ENCOUNTER — TELEPHONE (OUTPATIENT)
Dept: ONCOLOGY | Facility: CLINIC | Age: 58
End: 2018-02-01

## 2018-02-01 NOTE — TELEPHONE ENCOUNTER
Dr. Vickers reviewed pt Dexa scan results and advised pt to start on Vitamin D 2000 IU daily for osteopenia in the left hip. Left message for a return call.

## 2018-02-07 NOTE — TELEPHONE ENCOUNTER
Left another message for a return call to review Dexa scan results and Dr. Vickers's recommendations.

## 2018-02-08 NOTE — TELEPHONE ENCOUNTER
Patient returned call to clinic.  Reviewed Dr. Vickres's recommendations and the results.  Pt denies questions or concerns at this time, and states she will start taking the vitamin D today.    Advised to call back if questions or concerns arise.  Direct line provided.

## 2018-07-12 DIAGNOSIS — Z85.3 PERSONAL HISTORY OF MALIGNANT NEOPLASM OF BREAST: ICD-10-CM

## 2018-07-12 LAB
ALBUMIN SERPL-MCNC: 4.1 G/DL (ref 3.4–5)
ALP SERPL-CCNC: 62 U/L (ref 40–150)
ALT SERPL W P-5'-P-CCNC: 19 U/L (ref 0–50)
ANION GAP SERPL CALCULATED.3IONS-SCNC: 7 MMOL/L (ref 3–14)
AST SERPL W P-5'-P-CCNC: 24 U/L (ref 0–45)
BASOPHILS # BLD AUTO: 0 10E9/L (ref 0–0.2)
BASOPHILS NFR BLD AUTO: 0.3 %
BILIRUB SERPL-MCNC: 0.4 MG/DL (ref 0.2–1.3)
BUN SERPL-MCNC: 11 MG/DL (ref 7–30)
CALCIUM SERPL-MCNC: 9 MG/DL (ref 8.5–10.1)
CANCER AG27-29 SERPL-ACNC: 23 U/ML (ref 0–39)
CHLORIDE SERPL-SCNC: 102 MMOL/L (ref 94–109)
CO2 SERPL-SCNC: 26 MMOL/L (ref 20–32)
CREAT SERPL-MCNC: 0.83 MG/DL (ref 0.52–1.04)
DIFFERENTIAL METHOD BLD: ABNORMAL
EOSINOPHIL # BLD AUTO: 0.1 10E9/L (ref 0–0.7)
EOSINOPHIL NFR BLD AUTO: 1.2 %
ERYTHROCYTE [DISTWIDTH] IN BLOOD BY AUTOMATED COUNT: 12.1 % (ref 10–15)
GFR SERPL CREATININE-BSD FRML MDRD: 70 ML/MIN/1.7M2
GLUCOSE SERPL-MCNC: 107 MG/DL (ref 70–99)
HCT VFR BLD AUTO: 33.1 % (ref 35–47)
HGB BLD-MCNC: 11 G/DL (ref 11.7–15.7)
LYMPHOCYTES # BLD AUTO: 1.2 10E9/L (ref 0.8–5.3)
LYMPHOCYTES NFR BLD AUTO: 17.6 %
MCH RBC QN AUTO: 34.4 PG (ref 26.5–33)
MCHC RBC AUTO-ENTMCNC: 33.2 G/DL (ref 31.5–36.5)
MCV RBC AUTO: 103 FL (ref 78–100)
MONOCYTES # BLD AUTO: 0.7 10E9/L (ref 0–1.3)
MONOCYTES NFR BLD AUTO: 10.9 %
NEUTROPHILS # BLD AUTO: 4.6 10E9/L (ref 1.6–8.3)
NEUTROPHILS NFR BLD AUTO: 70 %
PLATELET # BLD AUTO: 316 10E9/L (ref 150–450)
POTASSIUM SERPL-SCNC: 3.9 MMOL/L (ref 3.4–5.3)
PROT SERPL-MCNC: 8.1 G/DL (ref 6.8–8.8)
RBC # BLD AUTO: 3.2 10E12/L (ref 3.8–5.2)
SODIUM SERPL-SCNC: 135 MMOL/L (ref 133–144)
WBC # BLD AUTO: 6.5 10E9/L (ref 4–11)

## 2018-07-12 PROCEDURE — 80053 COMPREHEN METABOLIC PANEL: CPT | Performed by: INTERNAL MEDICINE

## 2018-07-12 PROCEDURE — 85025 COMPLETE CBC W/AUTO DIFF WBC: CPT | Performed by: INTERNAL MEDICINE

## 2018-07-12 PROCEDURE — 86300 IMMUNOASSAY TUMOR CA 15-3: CPT | Performed by: INTERNAL MEDICINE

## 2018-07-12 PROCEDURE — 36415 COLL VENOUS BLD VENIPUNCTURE: CPT | Performed by: INTERNAL MEDICINE

## 2018-07-19 ENCOUNTER — ONCOLOGY VISIT (OUTPATIENT)
Dept: ONCOLOGY | Facility: CLINIC | Age: 58
End: 2018-07-19
Attending: INTERNAL MEDICINE
Payer: COMMERCIAL

## 2018-07-19 VITALS
DIASTOLIC BLOOD PRESSURE: 64 MMHG | BODY MASS INDEX: 21.92 KG/M2 | SYSTOLIC BLOOD PRESSURE: 142 MMHG | HEIGHT: 68 IN | WEIGHT: 144.6 LBS | HEART RATE: 105 BPM | TEMPERATURE: 97.7 F | RESPIRATION RATE: 18 BRPM | OXYGEN SATURATION: 99 %

## 2018-07-19 DIAGNOSIS — Z85.3 HISTORY OF LEFT BREAST CANCER: Primary | ICD-10-CM

## 2018-07-19 DIAGNOSIS — D75.89 MACROCYTOSIS: ICD-10-CM

## 2018-07-19 DIAGNOSIS — Z78.9 ALCOHOL USE: ICD-10-CM

## 2018-07-19 DIAGNOSIS — F17.200 TOBACCO USE DISORDER: ICD-10-CM

## 2018-07-19 DIAGNOSIS — R59.9 PALPABLE LYMPH NODE: ICD-10-CM

## 2018-07-19 PROCEDURE — 99214 OFFICE O/P EST MOD 30 MIN: CPT | Performed by: INTERNAL MEDICINE

## 2018-07-19 PROCEDURE — G0463 HOSPITAL OUTPT CLINIC VISIT: HCPCS

## 2018-07-19 RX ORDER — CITALOPRAM HYDROBROMIDE 20 MG/1
20 TABLET ORAL
COMMUNITY
Start: 2018-05-16 | End: 2019-01-17

## 2018-07-19 RX ORDER — LEVOTHYROXINE SODIUM 75 UG/1
75 TABLET ORAL
COMMUNITY
Start: 2018-04-13 | End: 2019-01-17

## 2018-07-19 RX ORDER — ANASTROZOLE 1 MG/1
1 TABLET ORAL DAILY
Qty: 90 TABLET | Refills: 3 | Status: SHIPPED | OUTPATIENT
Start: 2018-07-19 | End: 2019-09-24

## 2018-07-19 ASSESSMENT — PAIN SCALES - GENERAL: PAINLEVEL: NO PAIN (0)

## 2018-07-19 NOTE — PROGRESS NOTES
CHIEF COMPLAINT AND REASON FOR VISIT: left breast cancer , [id number] with RS of 56, s/p adjuvant DD AC, s/p RT, now on Arimidex since 2015     HISTORY OF PRESENT ILLNESS: she was diagnosed through screening mammogram with left breast infiltrating ductal cancer in 2014.  Mammogram found a 1 cm density with cluster of irregular calcification in the left breast upper lateral quatrant which is new.  Biopsy was done in mid November through ultrasound.    She subsequently underwent left lumpectomy with Dr. Davison at [facility] at the end of 2014, found infiltrating ductal cancer, grade 3 of 0.8 cm, ER 81% positive, CT 14% positive, HER-2/nancy negative, associated with grade 3 DCIS.  One sentinel node was negative.  She has pathologic T1b N0 disease.   She did not have any breast complaints.  She missed 1-year mammogram in .  Baseline mammogram in  was negative.     Staging in 2014 found no distal disease. She has M5cG3A5 disease.  RS back at 56 was very high.     DD AC x4 was advised in adjuvant setting. She made informed decision to proceed from  2015 to 3/2015.   She finished RT in 2015.  She is advised on arimidex 2015.      PAST MEDICAL HISTORY:  Hypothyroidism, depression, high cholesterol.  Last menstrual period around .      MEDICATIONS:  Reviewed in Epic system.      SOCIAL HISTORY:  She lives in [place].  Her primary care physician is Dr. Leana Santamaria from [facility].  She is an active smoker, less than a pack a day for 40 years. She is back to work as . She drinks ETOH daily.      FAMILY HISTORY:  Positive for Dad, who had tongue cancer and had metastatic disease,  from that, and Mom probably has had some type of cancer.  She also had dementia.      REVIEW OF SYSTEMS:   Good appetite. Weight is up.   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   She is s/p lympedema therapy.  She continues to smoke and daily ETOH /bear 12 oz bottle.       PHYSICAL  "EXAMINATION:   VITAL SIGNS:  Blood pressure 142/64, pulse 105, temperature 97.7  F (36.5  C), temperature source Tympanic, resp. rate 18, height 1.715 m (5' 7.5\"), weight 65.6 kg (144 lb 9.6 oz), SpO2 99 %, not currently breastfeeding.  GENERAL APPEARANCE:   Middle-aged woman, very pleasant, not in acute distress, tobacco smell.   HEENT: The patient is normocephalic, atraumatic. Pupils are equally reactive to light.  Sclerae are anicteric.  Moist oral mucosa.  Negative pharynx.  No oral thrush.   NECK:  Supple.  No jugular venous distention.  Thyroid is not palpable.   LYMPH NODES:  Superficial lymphadenopathy is not appreciable in the bilateral cervical, supraclavicular, axillary or inguinal areas.   CARDIOVASCULAR:  S1, S2 regular with no murmurs or gallops.  No carotid or abdominal bruits.   PULMONARY:  Lungs are clear to auscultation and percussion bilaterally.  There is no wheezing or rhonchi.   GASTROINTESTINAL:  Abdomen is soft, nontender.  No hepatosplenomegaly.  No signs of ascites.  No mass appreciable.   MUSCULOSKELETAL/EXTREMITIES:  No edema.  No cyanotic changes.  No signs of joint deformity.  No lymphedema.   NEUROLOGIC:  Cranial nerves II-XII are grossly intact.  Sensation intact.  Muscle strength and muscle tone symmetrical, 5/5 throughout.   BACK:  No spinal or paraspinal tenderness.  No CVA tenderness.   SKIN:  No petechiae.  No rash.  No signs of cellulitis.   BREASTS:  She has well-healed left lumpectomy scar. Left breast with fibrosed skin and protruded much comparing to right side due to lympedema.  She has fibrotic changes around the sentinel scar in her left axilla.  Right breast feels smooth and no skin changes.       Current LAB Data reviewed  CMP/marker are fine.   7/2018 wbc 5.0, hb 11.0 from 11.7,  from 102, PL nl.       Current Imaging Reviewed  1/2018 dexa - left hip osteopenia.     B/l MA 6/2017 at [facility]: negative.       Old data reviewed with summary  12/2016 hb 11.5, , " wbc/diff/PL are fine, cmp/marker are fine.   5/2016 hb 10.8, , wbc/diff/pl are nl, cmp, marker are fine    9/2015 hb 9.3, , wbc nl, pl nl, albumin 3.0 from 1.8 in 7/2015, K nl.   7/2015 HB 7.2, mcv 112, WBC 3.8, lymphopenia, PL nl, folate/B12/MMA nl, K 3.4, Mag nl.  Smear 7/2015: Anisocytosis is markedly increased with mild poikilocytosis, the latter consisting of scattered ovalocytes. Minimal polychromasia is present  and not increased. No red cell fragments, dacryocytes, or circulating nucleated red blood cell precursors are present to suspect a myelophthisic related anemic process.       RS at 55 in 12/2014.   12/2104 CBC/marker are fine.   In 10/2014 liver function tests from [facility] system are normal.      Lumpectomy at the end of 11/2014:  Infiltrating ductal cancer from the left breast, grade 3, associated high-grade DCIS, 0.8 cm.  Angiolymphatic invasion is absent.  ER 81% positive, WY 14% positive, HER-2/nancy negative.      Echo 12/2014 EF 64%  Dexa 12/2014: nl.  Cxr/bone scan 12/2014: negative  Diagnostic screening mammogram in 10/2014 found to have new cluster of microcalcification, 1.0 cm in the left breast, is new.        ASSESSMENT AND PLAN:    1.  pathologic [id number] early stage high-grade infiltrating ductal cancer with grade ductal carcinoma in situ, status post lumpectomy, 1 lymph node was negative, ER positive 85%, WY positive 14%, HER-2/nancy negative.   Due to her high recurrence score of 56, recommended DD AC x 4.  She made informed decision to proceed. She finished it in 3/2015 and had RT 4/2015.     Arimidex is advised in 4/2015. We talked about the side effects and how she should be monitored during this intervention.      She is due 6 months f/u with labs. she is overdue MA.     2. Smoker. Smoking cessation is advised. She is not willing to quit.     3. Persistent macrocytosis, without evidence of nutrition deficiency.   She is advised to cut down ETOH use. She dose not seem to  want to do so.     4. New palpable right axilla mobile small LNs on physical exam - advice right side dx MA with US to axilla.

## 2018-07-19 NOTE — PATIENT INSTRUCTIONS
Dr. Vickers would like you to have a Mammogram and have the result faxed here.      We would like to see you back in 6 months for a follow up appointment with labs prior.     When you are in need of a refill, please call your pharmacy and they will send us a request.      Copy of appointments, and after visit summary (AVS) given to patient.      If you have any questions please call Vicky Wells RN, BSN Oncology Hematology  [facility] [phone]. For questions after business hours, or on holidays/weekends, please call our after hours Nurse Triage line [phone]. Thank you.           MA over due at [place], to be done, and fax back the result.   6 months f/u with labs.

## 2018-07-19 NOTE — NURSING NOTE
"Oncology Rooming Note    July 19, 2018 11:54 AM   Gladis Wolfe is a 58 year old female who presents for:    Chief Complaint   Patient presents with     Oncology Clinic Visit     6 mo breast ca, review labs and mammogram results     Initial Vitals: /64 (BP Location: Right arm, Patient Position: Right side, Cuff Size: Adult Regular)  Pulse 105  Temp 97.7  F (36.5  C) (Tympanic)  Resp 18  Ht 1.715 m (5' 7.5\")  Wt 65.6 kg (144 lb 9.6 oz)  SpO2 99%  Breastfeeding? No  BMI 22.31 kg/m2 Estimated body mass index is 22.31 kg/(m^2) as calculated from the following:    Height as of this encounter: 1.715 m (5' 7.5\").    Weight as of this encounter: 65.6 kg (144 lb 9.6 oz). Body surface area is 1.77 meters squared.  No Pain (0) Comment: Data Unavailable   No LMP recorded. Patient is postmenopausal.  Allergies reviewed: Yes  Medications reviewed: Yes    Medications: Medication refills not needed today.  Pharmacy name entered into [facility]: [facility] - [address]    Clinical concerns:   6 mo breast ca, review labs and mammogram results    7 minutes for nursing intake (face to face time)     Cramen Mosher LPN            "

## 2018-07-19 NOTE — LETTER
2018         RE: Gladis Wolfe  [address] [place] [address]        Dear Colleague,    Thank you for referring your patient, Gladis Wolfe, to the [facility] [place] [facility]. Please see a copy of my visit note below.    CHIEF COMPLAINT AND REASON FOR VISIT: left breast cancer , [id number] with RS of 56, s/p adjuvant DD AC, s/p RT, now on Arimidex since 2015     HISTORY OF PRESENT ILLNESS: she was diagnosed through screening mammogram with left breast infiltrating ductal cancer in 2014.  Mammogram found a 1 cm density with cluster of irregular calcification in the left breast upper lateral quatrant which is new.  Biopsy was done in mid November through ultrasound.    She subsequently underwent left lumpectomy with Dr. Davison at [facility] at the end of 2014, found infiltrating ductal cancer, grade 3 of 0.8 cm, ER 81% positive, LA 14% positive, HER-2/nancy negative, associated with grade 3 DCIS.  One sentinel node was negative.  She has pathologic T1b N0 disease.   She did not have any breast complaints.  She missed 1-year mammogram in .  Baseline mammogram in  was negative.     Staging in 2014 found no distal disease. She has G0yD2L3 disease.  RS back at 55 was very high.     DD AC x4 was advised in adjuvant setting. She made informed decision to proceed from  2015 to 3/2015.   She finished RT in 2015.  She is advised on arimidex 2015.      PAST MEDICAL HISTORY:  Hypothyroidism, depression, high cholesterol.  Last menstrual period around .      MEDICATIONS:  Reviewed in Epic system.      SOCIAL HISTORY:  She lives in [place].  Her primary care physician is Dr. Leana Santamaria from [facility].  She is an active smoker, less than a pack a day for 40 years. She is back to work as . She drinks ETOH daily.      FAMILY HISTORY:  Positive for Dad, who had tongue cancer and had metastatic disease,  from that, and Mom probably has had some type of  "cancer.  She also had dementia.      REVIEW OF SYSTEMS:   Good appetite. Weight is up.   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   She is s/p lympedema therapy.  She continues to smoke and daily ETOH /bear 12 oz bottle.       PHYSICAL EXAMINATION:   VITAL SIGNS:  Blood pressure 142/64, pulse 105, temperature 97.7  F (36.5  C), temperature source Tympanic, resp. rate 18, height 1.715 m (5' 7.5\"), weight 65.6 kg (144 lb 9.6 oz), SpO2 99 %, not currently breastfeeding.  GENERAL APPEARANCE:   Middle-aged woman, very pleasant, not in acute distress, tobacco smell.   HEENT: The patient is normocephalic, atraumatic. Pupils are equally reactive to light.  Sclerae are anicteric.  Moist oral mucosa.  Negative pharynx.  No oral thrush.   NECK:  Supple.  No jugular venous distention.  Thyroid is not palpable.   LYMPH NODES:  Superficial lymphadenopathy is not appreciable in the bilateral cervical, supraclavicular, axillary or inguinal areas.   CARDIOVASCULAR:  S1, S2 regular with no murmurs or gallops.  No carotid or abdominal bruits.   PULMONARY:  Lungs are clear to auscultation and percussion bilaterally.  There is no wheezing or rhonchi.   GASTROINTESTINAL:  Abdomen is soft, nontender.  No hepatosplenomegaly.  No signs of ascites.  No mass appreciable.   MUSCULOSKELETAL/EXTREMITIES:  No edema.  No cyanotic changes.  No signs of joint deformity.  No lymphedema.   NEUROLOGIC:  Cranial nerves II-XII are grossly intact.  Sensation intact.  Muscle strength and muscle tone symmetrical, 5/5 throughout.   BACK:  No spinal or paraspinal tenderness.  No CVA tenderness.   SKIN:  No petechiae.  No rash.  No signs of cellulitis.   BREASTS:  She has well-healed left lumpectomy scar. Left breast with fibrosed skin and protruded much comparing to right side due to lympedema.  She has fibrotic changes around the sentinel scar in her left axilla.  Right breast feels smooth and no skin changes.       Current LAB Data " reviewed  CMP/marker are fine.   7/2018 wbc 5.0, hb 11.0 from 11.7,  from 102, PL nl.       Current Imaging Reviewed  1/2018 dexa - left hip osteopenia.     B/l MA 6/2017 at [facility]: negative.       Old data reviewed with summary  12/2016 hb 11.5, , wbc/diff/PL are fine, cmp/marker are fine.   5/2016 hb 10.8, , wbc/diff/pl are nl, cmp, marker are fine    9/2015 hb 9.3, , wbc nl, pl nl, albumin 3.0 from 1.8 in 7/2015, K nl.   7/2015 HB 7.2, mcv 112, WBC 3.8, lymphopenia, PL nl, folate/B12/MMA nl, K 3.4, Mag nl.  Smear 7/2015: Anisocytosis is markedly increased with mild poikilocytosis, the latter consisting of scattered ovalocytes. Minimal polychromasia is present  and not increased. No red cell fragments, dacryocytes, or circulating nucleated red blood cell precursors are present to suspect a myelophthisic related anemic process.       RS at 55 in 12/2014.   12/2104 CBC/marker are fine.   In 10/2014 liver function tests from [facility] system are normal.      Lumpectomy at the end of 11/2014:  Infiltrating ductal cancer from the left breast, grade 3, associated high-grade DCIS, 0.8 cm.  Angiolymphatic invasion is absent.  ER 81% positive, OR 14% positive, HER-2/nancy negative.      Echo 12/2014 EF 64%  Dexa 12/2014: nl.  Cxr/bone scan 12/2014: negative  Diagnostic screening mammogram in 10/2014 found to have new cluster of microcalcification, 1.0 cm in the left breast, is new.        ASSESSMENT AND PLAN:    1.  pathologic G9wU5M2 early stage high-grade infiltrating ductal cancer with grade ductal carcinoma in situ, status post lumpectomy, 1 lymph node was negative, ER positive 85%, OR positive 14%, HER-2/nancy negative.   Due to her high recurrence score of 56, recommended DD AC x 4.  She made informed decision to proceed. She finished it in 3/2015 and had RT 4/2015.     Arimidex is advised in 4/2015. We talked about the side effects and how she should be monitored during this intervention.      She  is due 6 months f/u with labs. she is overdue MA.     2. Smoker. Smoking cessation is advised. She is not willing to quit.     3. Persistent macrocytosis, without evidence of nutrition deficiency.   She is advised to cut down ETOH use. She dose not seem to want to do so.     4. New palpable right axilla mobile small LNs on physical exam - advice right side dx MA with US to axilla.       Again, thank you for allowing me to participate in the care of your patient.        Sincerely,        Elis Vickers MD, MD

## 2018-07-19 NOTE — MR AVS SNAPSHOT
After Visit Summary   7/19/2018    Gladis Wolfe    MRN: [id number]           Patient Information     Date Of Birth          1960        Visit Information        Provider Department      7/19/2018 11:45 AM Elis Vickers MD [facility]        Today's Diagnoses     History of left breast cancer    -  1    Tobacco use disorder        Macrocytosis        Palpable lymph node        Alcohol use          Care Instructions    Dr. Vickers would like you to have a Mammogram and have the result faxed here.      We would like to see you back in 6 months for a follow up appointment with labs prior.     When you are in need of a refill, please call your pharmacy and they will send us a request.      Copy of appointments, and after visit summary (AVS) given to patient.      If you have any questions please call Vicky Wells RN, BSN Oncology Hematology  [facility] [phone]. For questions after business hours, or on holidays/weekends, please call our after hours Nurse Triage line [phone]. Thank you.           MA over due at [place], to be done, and fax back the result.   6 months f/u with labs.           Follow-ups after your visit        Your next 10 appointments already scheduled     Guillermo 10, 2019 10:45 AM CST   LAB with [facility]   [facility]    [address] [id number] [phone]           Please do not eat 10-12 hours before your appointment if you are coming in fasting for labs on lipids, cholesterol, or glucose (sugar). This does not apply to pregnant women. Water, hot tea and black coffee (with nothing added) are okay. Do not drink other fluids, diet soda or chew gum.            Jan 17, 2019 10:45 AM CST   Return Visit with Elis Vickers MD   [facility] ([facility])    [facility]  [address]  [facility] [place] [facility] [place] [address] [phone]  "             Future tests that were ordered for you today     Open Future Orders        Priority Expected Expires Ordered    Comprehensive metabolic panel Routine 1/19/2019 7/19/2019 7/19/2018    CBC with platelets differential Routine 1/19/2019 7/19/2019 7/19/2018    Ca27.29  breast tumor marker Routine 1/19/2019 7/19/2019 7/19/2018    MA Diagnostic Digital Right Routine  7/19/2019 7/19/2018    MA Screen Left w/Warren Routine  7/19/2019 7/19/2018            Who to contact     If you have questions or need follow up information about today's clinic visit or your schedule please contact [facility] directly at [phone].  Normal or non-critical lab and imaging results will be communicated to you by US Toxicologyhart, letter or phone within 4 business days after the clinic has received the results. If you do not hear from us within 7 days, please contact the clinic through [employer]Ju[employer]et or phone. If you have a critical or abnormal lab result, we will notify you by phone as soon as possible.  Submit refill requests through [employer]ingNote or call your pharmacy and they will forward the refill request to us. Please allow 3 business days for your refill to be completed.          Additional Information About Your Visit        InvestingNote Information     [employer]Note gives you secure access to your electronic health record. If you see a primary care provider, you can also send messages to your care team and make appointments. If you have questions, please call your primary care clinic.  If you do not have a primary care provider, please call [phone] and they will assist you.        Care EveryWhere ID     This is your Care EveryWhere ID. This could be used by other organizations to access your [facility] medical records  [id number]        Your Vitals Were     Pulse Temperature Respirations Height Pulse Oximetry Breastfeeding?    105 97.7  F (36.5  C) (Tympanic) 18 1.715 m (5' 7.5\") 99% No    BMI (Body Mass Index)                   22.31 kg/m2 "            Blood Pressure from Last 3 Encounters:   07/19/18 142/64   01/18/18 152/48   07/05/17 128/82    Weight from Last 3 Encounters:   07/19/18 65.6 kg (144 lb 9.6 oz)   01/18/18 65.9 kg (145 lb 3.2 oz)   07/05/17 64.6 kg (142 lb 6.4 oz)                 Where to get your medicines      These medications were sent to [facility] [address]     Phone:  [phone]     anastrozole 1 MG tablet          Primary Care Provider Office Phone # Fax #    Leana Santamaria [phone]       [address] [address]        Equal Access to Services     ISAAC OBRIEN : Andrez Villanueva, warehan luqadaha, qaybta kaalmathomas cunningham, tangela walker. So [facility] [phone].    ATENCIÓN: Si habla español, tiene a del toro disposición servicios gratuitos de asistencia lingüística. Llame al [phone].    We comply with applicable federal civil rights laws and [place] laws. We do not discriminate on the basis of race, color, national origin, age, disability, sex, sexual orientation, or gender identity.            Thank you!     Thank you for choosing [facility]  for your care. Our goal is always to provide you with excellent care. Hearing back from our patients is one way we can continue to improve our services. Please take a few minutes to complete the written survey that you may receive in the mail after your visit with us. Thank you!             Your Updated Medication List - Protect others around you: Learn how to safely use, store and throw away your medicines at www.disposemymeds.org.          This list is accurate as of 7/19/18 12:25 PM.  Always use your most recent med list.                   Brand Name Dispense Instructions for use Diagnosis    anastrozole 1 MG tablet    ARIMIDEX    90 tablet    Take 1 tablet (1 mg) by mouth daily    Tobacco use disorder,  Macrocytosis       Calcium-Vitamin D 600-200 MG-UNIT Tabs       Breast cancer, left (H)       * citalopram 20 MG tablet    celeXA     Take 20 mg by mouth    Personal history of malignant neoplasm of breast, Tobacco use disorder, Macrocytosis       * citalopram 20 MG tablet    celeXA     Take 20 mg by mouth        * levothyroxine 75 MCG tablet    SYNTHROID/LEVOTHROID     Take 75 mcg by mouth    Breast cancer, left (H)       * levothyroxine 75 MCG tablet    SYNTHROID/LEVOTHROID     Take 75 mcg by mouth        MULTI-VITAMINS Tabs       Breast cancer, left (H)       OMEGA-3 FISH OIL PO      Take 1 g by mouth daily    Breast cancer, left (H)       potassium chloride 10 MEQ tablet    K-TAB,KLOR-CON    120 tablet    Take 2 tablets (20 mEq) by mouth 2 times daily    Hypokalemia       pravastatin 20 MG tablet    PRAVACHOL     Take 20 mg by mouth    Breast cancer, left (H)       saccharomyces boulardii 250 MG capsule    FLORASTOR     Take 250 mg by mouth 2 times daily    Breast cancer, left (H)       vitamin B complex with vitamin C Tabs tablet      Take 1 tablet by mouth daily        * Notice:  This list has 4 medication(s) that are the same as other medications prescribed for you. Read the directions carefully, and ask your doctor or other care provider to review them with you.

## 2018-08-10 ENCOUNTER — TRANSFERRED RECORDS (OUTPATIENT)
Dept: HEALTH INFORMATION MANAGEMENT | Facility: CLINIC | Age: 58
End: 2018-08-10

## 2019-01-10 DIAGNOSIS — Z85.3 HISTORY OF LEFT BREAST CANCER: ICD-10-CM

## 2019-01-10 LAB
ALBUMIN SERPL-MCNC: 4 G/DL (ref 3.4–5)
ALP SERPL-CCNC: 52 U/L (ref 40–150)
ALT SERPL W P-5'-P-CCNC: 23 U/L (ref 0–50)
ANION GAP SERPL CALCULATED.3IONS-SCNC: 2 MMOL/L (ref 3–14)
AST SERPL W P-5'-P-CCNC: 26 U/L (ref 0–45)
BASOPHILS # BLD AUTO: 0 10E9/L (ref 0–0.2)
BASOPHILS NFR BLD AUTO: 0.4 %
BILIRUB SERPL-MCNC: 0.4 MG/DL (ref 0.2–1.3)
BUN SERPL-MCNC: 9 MG/DL (ref 7–30)
CALCIUM SERPL-MCNC: 8.9 MG/DL (ref 8.5–10.1)
CANCER AG27-29 SERPL-ACNC: 21 U/ML (ref 0–39)
CHLORIDE SERPL-SCNC: 99 MMOL/L (ref 94–109)
CO2 SERPL-SCNC: 31 MMOL/L (ref 20–32)
CREAT SERPL-MCNC: 0.78 MG/DL (ref 0.52–1.04)
DIFFERENTIAL METHOD BLD: ABNORMAL
EOSINOPHIL # BLD AUTO: 0.2 10E9/L (ref 0–0.7)
EOSINOPHIL NFR BLD AUTO: 3.2 %
ERYTHROCYTE [DISTWIDTH] IN BLOOD BY AUTOMATED COUNT: 11.7 % (ref 10–15)
GFR SERPL CREATININE-BSD FRML MDRD: 83 ML/MIN/{1.73_M2}
GLUCOSE SERPL-MCNC: 75 MG/DL (ref 70–99)
HCT VFR BLD AUTO: 35.1 % (ref 35–47)
HGB BLD-MCNC: 11.8 G/DL (ref 11.7–15.7)
LYMPHOCYTES # BLD AUTO: 2.3 10E9/L (ref 0.8–5.3)
LYMPHOCYTES NFR BLD AUTO: 44.2 %
MCH RBC QN AUTO: 34.5 PG (ref 26.5–33)
MCHC RBC AUTO-ENTMCNC: 33.6 G/DL (ref 31.5–36.5)
MCV RBC AUTO: 103 FL (ref 78–100)
MONOCYTES # BLD AUTO: 0.9 10E9/L (ref 0–1.3)
MONOCYTES NFR BLD AUTO: 17 %
NEUTROPHILS # BLD AUTO: 1.9 10E9/L (ref 1.6–8.3)
NEUTROPHILS NFR BLD AUTO: 35.2 %
PLATELET # BLD AUTO: 246 10E9/L (ref 150–450)
POTASSIUM SERPL-SCNC: 4.4 MMOL/L (ref 3.4–5.3)
PROT SERPL-MCNC: 7.5 G/DL (ref 6.8–8.8)
RBC # BLD AUTO: 3.42 10E12/L (ref 3.8–5.2)
SODIUM SERPL-SCNC: 132 MMOL/L (ref 133–144)
WBC # BLD AUTO: 5.3 10E9/L (ref 4–11)

## 2019-01-10 PROCEDURE — 85025 COMPLETE CBC W/AUTO DIFF WBC: CPT | Performed by: INTERNAL MEDICINE

## 2019-01-10 PROCEDURE — 86300 IMMUNOASSAY TUMOR CA 15-3: CPT | Performed by: INTERNAL MEDICINE

## 2019-01-10 PROCEDURE — 80053 COMPREHEN METABOLIC PANEL: CPT | Performed by: INTERNAL MEDICINE

## 2019-01-10 PROCEDURE — 36415 COLL VENOUS BLD VENIPUNCTURE: CPT | Performed by: INTERNAL MEDICINE

## 2019-01-17 ENCOUNTER — ONCOLOGY VISIT (OUTPATIENT)
Dept: ONCOLOGY | Facility: CLINIC | Age: 59
End: 2019-01-17
Attending: INTERNAL MEDICINE
Payer: COMMERCIAL

## 2019-01-17 ENCOUNTER — HOSPITAL ENCOUNTER (OUTPATIENT)
Dept: CT IMAGING | Facility: CLINIC | Age: 59
Discharge: HOME | End: 2019-01-17
Attending: INTERNAL MEDICINE | Admitting: INTERNAL MEDICINE
Payer: COMMERCIAL

## 2019-01-17 VITALS
WEIGHT: 145.5 LBS | HEART RATE: 81 BPM | TEMPERATURE: 96.8 F | RESPIRATION RATE: 18 BRPM | SYSTOLIC BLOOD PRESSURE: 148 MMHG | OXYGEN SATURATION: 97 % | HEIGHT: 68 IN | BODY MASS INDEX: 22.05 KG/M2 | DIASTOLIC BLOOD PRESSURE: 68 MMHG

## 2019-01-17 DIAGNOSIS — Z87.891 PERSONAL HISTORY OF TOBACCO USE: ICD-10-CM

## 2019-01-17 DIAGNOSIS — Z78.9 ALCOHOL USE: ICD-10-CM

## 2019-01-17 DIAGNOSIS — Z85.3 HISTORY OF LEFT BREAST CANCER: Primary | ICD-10-CM

## 2019-01-17 DIAGNOSIS — F17.200 TOBACCO USE DISORDER: ICD-10-CM

## 2019-01-17 DIAGNOSIS — D75.89 MACROCYTOSIS: ICD-10-CM

## 2019-01-17 DIAGNOSIS — M85.80 OSTEOPENIA, UNSPECIFIED LOCATION: ICD-10-CM

## 2019-01-17 PROCEDURE — G0297 LDCT FOR LUNG CA SCREEN: HCPCS

## 2019-01-17 PROCEDURE — G0463 HOSPITAL OUTPT CLINIC VISIT: HCPCS

## 2019-01-17 PROCEDURE — 99214 OFFICE O/P EST MOD 30 MIN: CPT | Performed by: INTERNAL MEDICINE

## 2019-01-17 PROCEDURE — G0296 VISIT TO DETERM LDCT ELIG: HCPCS | Performed by: INTERNAL MEDICINE

## 2019-01-17 RX ORDER — ALENDRONATE SODIUM 35 MG/1
35 TABLET ORAL
Qty: 12 TABLET | Refills: 3 | Status: SHIPPED | OUTPATIENT
Start: 2019-01-17 | End: 2020-02-11

## 2019-01-17 ASSESSMENT — PAIN SCALES - GENERAL: PAINLEVEL: NO PAIN (0)

## 2019-01-17 ASSESSMENT — MIFFLIN-ST. JEOR: SCORE: 1275.54

## 2019-01-17 NOTE — LETTER
2019         RE: Gladis Wolfe  [address] [facility] [id number]        Dear Colleague,    Thank you for referring your patient, Gladis Wolfe, to the [facility]. Please see a copy of my visit note below.    [facility] COMPLAINT AND REASON FOR VISIT: left breast cancer , [id number] with RS of 56, s/p adjuvant DD AC, s/p RT, now on Arimidex since 2015     HISTORY OF PRESENT ILLNESS: she was diagnosed through screening mammogram with left breast infiltrating ductal cancer in 2014.  Mammogram found a 1 cm density with cluster of irregular calcification in the left breast upper lateral quatrant which is new.  Biopsy was done in mid November through ultrasound.    She subsequently underwent left lumpectomy with Dr. Davison at [facility] at the end of 2014, found infiltrating ductal cancer, grade 3 of 0.8 cm, ER 81% positive, DC 14% positive, HER-2/nancy negative, associated with grade 3 DCIS.  One sentinel node was negative.  She has pathologic T1b N0 disease.   She did not have any breast complaints.  She missed 1-year mammogram in .  Baseline mammogram in  was negative.     Staging in 2014 found no distal disease. She has G5aS4R9 disease.  RS back at 56 was very high.     DD AC x4 was advised in adjuvant setting. She made informed decision to proceed from  2015 to 3/2015.   She finished RT in 2015.  She is advised on arimidex 2015.      PAST MEDICAL HISTORY:  Hypothyroidism, depression, high cholesterol.  Last menstrual period around .      MEDICATIONS:  Reviewed in Epic system.      SOCIAL HISTORY:  She lives in [place].  Her primary care physician is Dr. Leana Santamaria from [facility].  She is an active smoker, less than a pack a day for 40 years. She is back to work as . She drinks ETOH daily.      FAMILY HISTORY:  Positive for Dad, who had tongue cancer and had metastatic disease,  from that, and Mom probably has had some type of cancer.  " She also had dementia.      REVIEW OF SYSTEMS:   Good appetite. Weight is up.   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   She is s/p lympedema therapy.  She continues to smoke and daily ETOH /bear 12 oz bottle.       PHYSICAL EXAMINATION:   VITAL SIGNS:  Blood pressure 148/68, pulse 81, temperature 96.8  F (36  C), temperature source Tympanic, resp. rate 18, height 1.715 m (5' 7.5\"), weight 66 kg (145 lb 8 oz), SpO2 97 %, not currently breastfeeding.    ECOG 0    GENERAL APPEARANCE:   Middle-aged woman, very pleasant, not in acute distress, tobacco smell.   HEENT: The patient is normocephalic, atraumatic. Pupils are equally reactive to light.  Sclerae are anicteric.  Moist oral mucosa.  Negative pharynx.  No oral thrush.   NECK:  Supple.  No jugular venous distention.  Thyroid is not palpable.   LYMPH NODES:  Superficial lymphadenopathy is not appreciable in the bilateral cervical, supraclavicular, axillary or inguinal areas.   CARDIOVASCULAR:  S1, S2 regular with no murmurs or gallops.  No carotid or abdominal bruits.   PULMONARY:  Lungs are clear to auscultation and percussion bilaterally.  There is no wheezing or rhonchi.   GASTROINTESTINAL:  Abdomen is soft, nontender.  No hepatosplenomegaly.  No signs of ascites.  No mass appreciable.   MUSCULOSKELETAL/EXTREMITIES:  No edema.  No cyanotic changes.  No signs of joint deformity.  No lymphedema.   NEUROLOGIC:  Cranial nerves II-XII are grossly intact.  Sensation intact.  Muscle strength and muscle tone symmetrical, 5/5 throughout.   BACK:  No spinal or paraspinal tenderness.  No CVA tenderness.   SKIN:  No petechiae.  No rash.  No signs of cellulitis.   BREASTS:  She has well-healed left lumpectomy scar. Left breast with fibrosed skin and protruded much comparing to right side due to lympedema.  She has fibrotic changes around the sentinel scar in her left axilla.  Right breast feels smooth and no skin changes.       Current LAB Data reviewed  Na 132, "   wbc nl, hb 11.8 from  11.0 from 11.7,    stable  from 102, PL nl.   Marker is nl.       Current Imaging Reviewed  MA 8/2018 from [facility] - negative.      Old data reviewed with summary  1/2018 dexa - left hip osteopenia.       RS at 56 in 12/2014.     Lumpectomy at the end of 11/2014:  Infiltrating ductal cancer from the left breast, grade 3, associated high-grade DCIS, 0.8 cm.  Angiolymphatic invasion is absent.  ER 81% positive, SD 14% positive, HER-2/nancy negative.         ASSESSMENT AND PLAN:    1.  pathologic R2yS3Y0 early stage high-grade infiltrating ductal cancer with grade ductal carcinoma in situ, status post lumpectomy.  Due to her high recurrence score of 56, recommended DD AC x 4.  She made informed decision to proceed. She finished it in 3/2015 and had RT 4/2015.     Arimidex is advised in 4/2015. We talked about the side effects and how she should be monitored during this intervention.      She is due 6 months f/u with labs.MA due in July.      2. Smoker. Smoking cessation is advised. She is working on it.   We discussed the benefit of low dose CT screen.     3. Persistent macrocytosis, without evidence of nutrition deficiency.   She is advised to cut down ETOH use. She dose not seem to want to do so.     4. Osteopenia on AI - advice to try fosamax 35 mg po q wk.           Again, thank you for allowing me to participate in the care of your patient.        Sincerely,        Elis Vcikers MD, MD

## 2019-01-17 NOTE — PATIENT INSTRUCTIONS
Dr. Vickers would like you to have a low dose CT done.     We would like to see you back in 6 months for a follow up appointment with labs prior.     A prescription for Arimidex has been sent to [facility] - [address]    When you are in need of a refill, please call your pharmacy and they will send us a request.      Copy of appointments, and after visit summary (AVS) given to patient.      If you have any questions please call Vicky Wells RN, BSN Oncology Hematology  [facility] [phone]. For questions after business hours, or on holidays/weekends, please call our after hours Nurse Triage line [phone]. Thank you.     Lung Cancer Screening   Frequently Asked Questions  If you are at high-risk for lung cancer, getting screened with low-dose computed tomography (LDCT) every year can help save your life. This handout offers answers to some of the most common questions about lung cancer screening. If you have other questions, please call [phone] ([phone]).     What is it?  Lung cancer screening uses special X-ray technology to create an image of your lung tissue. The exam is quick and easy and takes less than 10 seconds. We don t give you any medicine or use any needles. You can eat before and after the exam. You don t need to change your clothes as long as the clothing on your chest doesn t contain metal. But, you do need to be able to hold your breath for at least 6 seconds during the exam.    What is the goal of lung cancer screening?  The goal of lung cancer screening is to save lives. Many times, lung cancer is not found until a person starts having physical symptoms. Lung cancer screening can help detect lung cancer in the earliest stages when it may be easier to treat.    Who should be screened for lung cancer?  We suggest lung cancer screening for anyone who is at high-risk for lung cancer. You are in  the high-risk group if you:      are between the ages of 55 and 79, and    have smoked at least 1 pack of cigarettes a day for 30 or more years, and    still smoke or have quit within the past 15 years.    However, if you have a new cough or shortness of breath, you should talk to your doctor before being screened.    Some national lung health advocacy groups also recommend screening for people ages 50 to 79 who have smoked an average of 1 pack of cigarettes a day for 20 years. They must also have at least 1 other risk factor for lung cancer, not including exposure to secondhand smoke. Other risk factors are having had cancer in the past, emphysema, pulmonary fibrosis, COPD, a family history of lung cancer, or exposure to certain materials such as arsenic, asbestos, beryllium, cadmium, chromium, diesel fumes, nickel, radon or silica. Your care team can help you know if you have one of these risk factors.     Why does it matter if I have symptoms?  Certain symptoms can be a sign that you have a condition in your lungs that should be checked and treated by your doctor. These symptoms include fever, chest pain, a new or changing cough, shortness of breath that you have never felt before, coughing up blood or unexplained weight loss. Having any of these symptoms can greatly affect the results of lung cancer screening.       Should all smokers get an LDCT lung cancer screening exam?  It depends. Lung cancer screening is for a very specific group of men and women who have a history of heavy smoking over a long period of time (see  Who should be screened for lung cancer  above).  I am in the high-risk group, but have been diagnosed with cancer in the past. Is LDCT lung cancer screening right for me?  In some cases, you should not have LDCT lung screening, such as when your doctor is already following your cancer with CT scan studies. Your doctor will help you decide if LDCT lung screening is right for you.  Do I need to  have a screening exam every year?  Yes. If you are in the high-risk group described earlier, you should get an LDCT lung cancer screening exam every year until you are 79, or are no longer willing or able to undergo screening and possible procedures to diagnose and treat lung cancer.  How effective is LDCT at preventing death from lung cancer?  Studies have shown that LDCT lung cancer screening can lower the risk of death from lung cancer by 20 percent in people who are at high-risk.  What are the risks?  There are some risks and limitations of LDCT lung cancer screening. We want to make sure you understand the risks and benefits, so please let us know if you have any questions. Your doctor may want to talk with you more about these risks.    Radiation exposure: As with any exam that uses radiation, there is a very small increased risk of cancer. The amount of radiation in LDCT is small--about the same amount a person would get from a mammogram. Your doctor orders the exam when he or she feels the potential benefits outweigh the risks.    False negatives: No test is perfect, including LDCT. It is possible that you may have a medical condition, including lung cancer, that is not found during your exam. This is called a false negative result.    False positives and more testing: LDCT very often finds something in the lung that could be cancer, but in fact is not. This is called a false positive result. False positive tests often cause anxiety. To make sure these findings are not cancer, you may need to have more tests. These tests will be done only if you give us permission. Sometimes patients need a treatment that can have side effects, such as a biopsy. For more information on false positives, see  What can I expect from the results?     Findings not related to lung cancer: Your LDCT exam also takes pictures of areas of your body next to your lungs. In a very small number of cases, the CT scan will show an abnormal  finding in one of these areas, such as your kidneys, adrenal glands, liver or thyroid. This finding may not be serious, but you may need more tests. Your doctor can help you decide what other tests you may need, if any.  What can I expect from the results?  About 1 out of 4 LDCT exams will find something that may need more tests. Most of the time, these findings are lung nodules. Lung nodules are very small collections of tissue in the lung. These nodules are very common, and the vast majority--more than 97 percent--are not cancer (benign). Most are normal lymph nodes or small areas of scarring from past infections.  But, if a small lung nodule is found to be cancer, the cancer can be cured more than 90 percent of the time. To know if the nodule is cancer, we may need to get more images before your next yearly screening exam. If the nodule has suspicious features (for example, it is large, has an odd shape or grows over time), we will refer you to a specialist for further testing.  Will my doctor also get the results?  Yes. Your doctor will get a copy of your results.  Is it okay to keep smoking now that there s a cancer screening exam?  No. Tobacco is one of the strongest cancer-causing agents. It causes not only lung cancer, but other cancers and cardiovascular (heart) diseases as well. The damage caused by smoking builds over time. This means that the longer you smoke, the higher your risk of disease. While it is never too late to quit, the sooner you quit, the better.  Where can I find help to quit smoking?  The best way to prevent lung cancer is to stop smoking. If you have already quit smoking, congratulations and keep it up! For help on quitting smoking, please call [employer] at 5-381-823-WNAH (2391) or the American Cancer Society at 1-159.542.5998 to find local resources near you.  One-on-one health coaching:  If you d prefer to work individually with a health care provider on tobacco cessation, we  offer:      Medication Therapy Management:  Our specially trained pharmacists work closely with you and your doctor to help you quit smoking.  Call [phone] or [phone] (toll free).     Can Do: Health coaching offered by [facility].  [email or web address] ([email or web address]      Low dose CT to be done. 6 months f/u with labs

## 2019-01-17 NOTE — PROGRESS NOTES
[facility] COMPLAINT AND REASON FOR VISIT: left breast cancer , A3bC9C2 with RS of 56, s/p adjuvant DD AC, s/p RT, now on Arimidex since 2015     HISTORY OF PRESENT ILLNESS: she was diagnosed through screening mammogram with left breast infiltrating ductal cancer in 2014.  Mammogram found a 1 cm density with cluster of irregular calcification in the left breast upper lateral quatrant which is new.  Biopsy was done in mid November through ultrasound.    She subsequently underwent left lumpectomy with Dr. Davison at [facility] at the end of 2014, found infiltrating ductal cancer, grade 3 of 0.8 cm, ER 81% positive, NM 14% positive, HER-2/nancy negative, associated with grade 3 DCIS.  One sentinel node was negative.  She has pathologic T1b N0 disease.   She did not have any breast complaints.  She missed 1-year mammogram in .  Baseline mammogram in  was negative.     Staging in 2014 found no distal disease. She has [id number] disease.  RS back at 55 was very high.     DD AC x4 was advised in adjuvant setting. She made informed decision to proceed from  2015 to 3/2015.   She finished RT in 2015.  She is advised on arimidex 2015.      PAST MEDICAL HISTORY:  Hypothyroidism, depression, high cholesterol.  Last menstrual period around .      MEDICATIONS:  Reviewed in Epic system.      SOCIAL HISTORY:  She lives in [place].  Her primary care physician is Dr. Leana Santamaria from [facility].  She is an active smoker, less than a pack a day for 40 years. She is back to work as . She drinks ETOH daily.      FAMILY HISTORY:  Positive for Dad, who had tongue cancer and had metastatic disease,  from that, and Mom probably has had some type of cancer.  She also had dementia.      REVIEW OF SYSTEMS:   Good appetite. Weight is up.   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   She is s/p lympedema therapy.  She continues to smoke and daily ETOH /bear 12 oz bottle.       PHYSICAL  "EXAMINATION:   VITAL SIGNS:  Blood pressure 148/68, pulse 81, temperature 96.8  F (36  C), temperature source Tympanic, resp. rate 18, height 1.715 m (5' 7.5\"), weight 66 kg (145 lb 8 oz), SpO2 97 %, not currently breastfeeding.    ECOG 0    GENERAL APPEARANCE:   Middle-aged woman, very pleasant, not in acute distress, tobacco smell.   HEENT: The patient is normocephalic, atraumatic. Pupils are equally reactive to light.  Sclerae are anicteric.  Moist oral mucosa.  Negative pharynx.  No oral thrush.   NECK:  Supple.  No jugular venous distention.  Thyroid is not palpable.   LYMPH NODES:  Superficial lymphadenopathy is not appreciable in the bilateral cervical, supraclavicular, axillary or inguinal areas.   CARDIOVASCULAR:  S1, S2 regular with no murmurs or gallops.  No carotid or abdominal bruits.   PULMONARY:  Lungs are clear to auscultation and percussion bilaterally.  There is no wheezing or rhonchi.   GASTROINTESTINAL:  Abdomen is soft, nontender.  No hepatosplenomegaly.  No signs of ascites.  No mass appreciable.   MUSCULOSKELETAL/EXTREMITIES:  No edema.  No cyanotic changes.  No signs of joint deformity.  No lymphedema.   NEUROLOGIC:  Cranial nerves II-XII are grossly intact.  Sensation intact.  Muscle strength and muscle tone symmetrical, 5/5 throughout.   BACK:  No spinal or paraspinal tenderness.  No CVA tenderness.   SKIN:  No petechiae.  No rash.  No signs of cellulitis.   BREASTS:  She has well-healed left lumpectomy scar. Left breast with fibrosed skin and protruded much comparing to right side due to lympedema.  She has fibrotic changes around the sentinel scar in her left axilla.  Right breast feels smooth and no skin changes.       Current LAB Data reviewed  Na 132,   wbc nl, hb 11.8 from  11.0 from 11.7,    stable  from 102, PL nl.   Marker is nl.       Current Imaging Reviewed  MA 8/2018 from [facility] - negative.      Old data reviewed with summary  1/2018 dexa - left hip osteopenia.       RS " at 56 in 12/2014.     Lumpectomy at the end of 11/2014:  Infiltrating ductal cancer from the left breast, grade 3, associated high-grade DCIS, 0.8 cm.  Angiolymphatic invasion is absent.  ER 81% positive, AZ 14% positive, HER-2/nancy negative.         ASSESSMENT AND PLAN:    1.  pathologic Q6hT6K0 early stage high-grade infiltrating ductal cancer with grade ductal carcinoma in situ, status post lumpectomy.  Due to her high recurrence score of 56, recommended DD AC x 4.  She made informed decision to proceed. She finished it in 3/2015 and had RT 4/2015.     Arimidex is advised in 4/2015. We talked about the side effects and how she should be monitored during this intervention.      She is due 6 months f/u with labs.MA due in July.      2. Smoker. Smoking cessation is advised. She is working on it.   We discussed the benefit of low dose CT screen.     3. Persistent macrocytosis, without evidence of nutrition deficiency.   She is advised to cut down ETOH use. She dose not seem to want to do so.     4. Osteopenia on AI - advice to try fosamax 35 mg po q wk.

## 2019-01-17 NOTE — NURSING NOTE
"Oncology Rooming Note    January 17, 2019 11:03 AM   Gladis Wolfe is a 58 year old female who presents for:    Chief Complaint   Patient presents with     Oncology Clinic Visit     6 month follow up breast cancer. Review lab and mammogram results.      Initial Vitals: /68 (BP Location: Right arm, Patient Position: Sitting, Cuff Size: Adult Large)   Pulse 81   Temp 96.8  F (36  C) (Tympanic)   Resp 18   Ht 1.715 m (5' 7.5\")   Wt 66 kg (145 lb 8 oz)   SpO2 97%   Breastfeeding? No   BMI 22.45 kg/m   Estimated body mass index is 22.45 kg/m  as calculated from the following:    Height as of this encounter: 1.715 m (5' 7.5\").    Weight as of this encounter: 66 kg (145 lb 8 oz). Body surface area is 1.77 meters squared.  No Pain (0) Comment: Data Unavailable   No LMP recorded. Patient is postmenopausal.  Allergies reviewed: Yes  Medications reviewed: Yes    Medications: Medication refills not needed today.  Pharmacy name entered into [employer]: [facility] - [address]    Clinical concerns: 6 month follow up breast cancer. Review lab and mammogram results.     8 minutes for nursing intake (face to face time)     Siomara Goode CMA            "

## 2019-07-11 DIAGNOSIS — Z85.3 HISTORY OF LEFT BREAST CANCER: ICD-10-CM

## 2019-07-11 LAB
ALBUMIN SERPL-MCNC: 4.1 G/DL (ref 3.4–5)
ALP SERPL-CCNC: 60 U/L (ref 40–150)
ALT SERPL W P-5'-P-CCNC: 18 U/L (ref 0–50)
ANION GAP SERPL CALCULATED.3IONS-SCNC: 7 MMOL/L (ref 3–14)
AST SERPL W P-5'-P-CCNC: 21 U/L (ref 0–45)
BASOPHILS # BLD AUTO: 0 10E9/L (ref 0–0.2)
BASOPHILS NFR BLD AUTO: 0.6 %
BILIRUB SERPL-MCNC: 0.4 MG/DL (ref 0.2–1.3)
BUN SERPL-MCNC: 10 MG/DL (ref 7–30)
CALCIUM SERPL-MCNC: 9.1 MG/DL (ref 8.5–10.1)
CANCER AG27-29 SERPL-ACNC: 22 U/ML (ref 0–39)
CHLORIDE SERPL-SCNC: 102 MMOL/L (ref 94–109)
CO2 SERPL-SCNC: 26 MMOL/L (ref 20–32)
CREAT SERPL-MCNC: 0.77 MG/DL (ref 0.52–1.04)
DIFFERENTIAL METHOD BLD: ABNORMAL
EOSINOPHIL # BLD AUTO: 0.2 10E9/L (ref 0–0.7)
EOSINOPHIL NFR BLD AUTO: 2.8 %
ERYTHROCYTE [DISTWIDTH] IN BLOOD BY AUTOMATED COUNT: 12.2 % (ref 10–15)
GFR SERPL CREATININE-BSD FRML MDRD: 84 ML/MIN/{1.73_M2}
GLUCOSE SERPL-MCNC: 84 MG/DL (ref 70–99)
HCT VFR BLD AUTO: 33.7 % (ref 35–47)
HGB BLD-MCNC: 11.5 G/DL (ref 11.7–15.7)
LYMPHOCYTES # BLD AUTO: 1.4 10E9/L (ref 0.8–5.3)
LYMPHOCYTES NFR BLD AUTO: 26.3 %
MCH RBC QN AUTO: 34 PG (ref 26.5–33)
MCHC RBC AUTO-ENTMCNC: 34.1 G/DL (ref 31.5–36.5)
MCV RBC AUTO: 100 FL (ref 78–100)
MONOCYTES # BLD AUTO: 1 10E9/L (ref 0–1.3)
MONOCYTES NFR BLD AUTO: 18.1 %
NEUTROPHILS # BLD AUTO: 2.8 10E9/L (ref 1.6–8.3)
NEUTROPHILS NFR BLD AUTO: 52.2 %
PLATELET # BLD AUTO: 252 10E9/L (ref 150–450)
POTASSIUM SERPL-SCNC: 4.1 MMOL/L (ref 3.4–5.3)
PROT SERPL-MCNC: 7.7 G/DL (ref 6.8–8.8)
RBC # BLD AUTO: 3.38 10E12/L (ref 3.8–5.2)
SODIUM SERPL-SCNC: 135 MMOL/L (ref 133–144)
WBC # BLD AUTO: 5.3 10E9/L (ref 4–11)

## 2019-07-11 PROCEDURE — 85025 COMPLETE CBC W/AUTO DIFF WBC: CPT | Performed by: INTERNAL MEDICINE

## 2019-07-11 PROCEDURE — 80053 COMPREHEN METABOLIC PANEL: CPT | Performed by: INTERNAL MEDICINE

## 2019-07-11 PROCEDURE — 86300 IMMUNOASSAY TUMOR CA 15-3: CPT | Performed by: INTERNAL MEDICINE

## 2019-07-11 PROCEDURE — 36415 COLL VENOUS BLD VENIPUNCTURE: CPT | Performed by: INTERNAL MEDICINE

## 2019-07-18 ENCOUNTER — ONCOLOGY VISIT (OUTPATIENT)
Dept: ONCOLOGY | Facility: CLINIC | Age: 59
End: 2019-07-18
Attending: INTERNAL MEDICINE

## 2019-07-18 VITALS
WEIGHT: 147.8 LBS | OXYGEN SATURATION: 94 % | HEART RATE: 79 BPM | RESPIRATION RATE: 18 BRPM | TEMPERATURE: 98.6 F | DIASTOLIC BLOOD PRESSURE: 64 MMHG | SYSTOLIC BLOOD PRESSURE: 154 MMHG | BODY MASS INDEX: 23.2 KG/M2 | HEIGHT: 67 IN

## 2019-07-18 DIAGNOSIS — Z78.9 ALCOHOL USE: ICD-10-CM

## 2019-07-18 DIAGNOSIS — F17.200 TOBACCO USE DISORDER: ICD-10-CM

## 2019-07-18 DIAGNOSIS — Z85.3 HISTORY OF LEFT BREAST CANCER: Primary | ICD-10-CM

## 2019-07-18 DIAGNOSIS — M85.80 OSTEOPENIA, UNSPECIFIED LOCATION: ICD-10-CM

## 2019-07-18 PROCEDURE — G0463 HOSPITAL OUTPT CLINIC VISIT: HCPCS

## 2019-07-18 PROCEDURE — 99215 OFFICE O/P EST HI 40 MIN: CPT | Performed by: INTERNAL MEDICINE

## 2019-07-18 ASSESSMENT — PAIN SCALES - GENERAL: PAINLEVEL: NO PAIN (0)

## 2019-07-18 ASSESSMENT — MIFFLIN-ST. JEOR: SCORE: 1269.08

## 2019-07-18 NOTE — PATIENT INSTRUCTIONS
We would like to see you back in 6 months for a follow up appointment with labs prior.     When you are in need of a refill, please call your pharmacy and they will send us a request.      Copy of appointments, and after visit summary (AVS) given to patient.      If you have any questions please call Vicky Wells RN, BSN Oncology Hematology  [facility] [phone]. For questions after business hours, or on holidays/weekends, please call our after hours Nurse Triage line [phone]. Thank you.         6 months f/u with labs

## 2019-07-18 NOTE — LETTER
"    7/18/2019         RE: Gladis Wolfe  [address] [facility] [id number]        Dear Colleague,    Thank you for referring your patient, Gladis Wolfe, to the [facility]. Please see a copy of my visit note below.    Oncology Rooming Note    July 18, 2019 11:12 AM   Gladis Wolfe is a 59 year old female who presents for:    Chief Complaint   Patient presents with     Oncology Clinic Visit     6 month recheck History of left breast cancer, review Labs     Initial Vitals: /64 (BP Location: Right arm, Patient Position: Sitting, Cuff Size: Adult Regular)   Pulse 79   Temp 98.6  F (37  C) (Tympanic)   Resp 18   Ht 1.695 m (5' 6.75\")   Wt 67 kg (147 lb 12.8 oz)   SpO2 94%   Breastfeeding? No   BMI 23.32 kg/m    Estimated body mass index is 23.32 kg/m  as calculated from the following:    Height as of this encounter: 1.695 m (5' 6.75\").    Weight as of this encounter: 67 kg (147 lb 12.8 oz). Body surface area is 1.78 meters squared.  No Pain (0) Comment: Data Unavailable   No LMP recorded. Patient is postmenopausal.  Allergies reviewed: Yes  Medications reviewed: Yes    Medications: Medication refills not needed today.  Pharmacy name entered into [employer]: [facility]- - [place], [place] - [address] [place] [address]    Clinical concerns: 6 month follow up breast cancer. Review lab results. Overall doing well per patient.       Siomara Goode, [facility] COMPLAINT AND REASON FOR VISIT: left breast cancer 2014, [id number] with RS of 56, s/p adjuvant DD AC, s/p RT, now on Arimidex since 4/2015     HISTORY OF PRESENT ILLNESS: she was diagnosed through screening mammogram with left breast infiltrating ductal cancer in 11/2014.  Mammogram found a 1 cm density with cluster of irregular calcification in the left breast upper lateral quatrant which is new.  Biopsy was done in mid November through ultrasound.    She subsequently underwent left lumpectomy with " "Dr. Davison at [facility] at the end of 2014, found infiltrating ductal cancer, grade 3 of 0.8 cm, ER 81% positive, CO 14% positive, HER-2/nancy negative, associated with grade 3 DCIS.  One sentinel node was negative.  She has pathologic T1b N0 disease.   She did not have any breast complaints.  She missed 1-year mammogram in .  Baseline mammogram in  was negative.     Staging in 2014 found no distal disease. She has E3iI0T9 disease.  RS back at 56 was very high.     DD AC x4 was advised in adjuvant setting. She made informed decision to proceed from  2015 to 3/2015.   She finished RT in 2015.  She is advised on arimidex 2015.      PAST MEDICAL HISTORY:  Hypothyroidism, depression, high cholesterol.  Last menstrual period around .      MEDICATIONS:  Reviewed in Epic system.      SOCIAL HISTORY:  She lives in [place].  Her primary care physician is Dr. Leana Santamaria from [facility].  She is an active smoker, less than a pack a day for 40 years. She is back to work as . She drinks ETOH daily.      FAMILY HISTORY:  Positive for Dad, who had tongue cancer and had metastatic disease,  from that, and Mom probably has had some type of cancer.  She also had dementia.        REVIEW OF SYSTEMS:   Good appetite. Weight is up.   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   She is s/p lympedema therapy.  She continues to smoke and daily ETOH /bear 12 oz bottle.       PHYSICAL EXAMINATION:   VITAL SIGNS:  Blood pressure 154/64, pulse 79, temperature 98.6  F (37  C), temperature source Tympanic, resp. rate 18, height 1.695 m (5' 6.75\"), weight 67 kg (147 lb 12.8 oz), SpO2 94 %, not currently breastfeeding.    ECOG 0    GENERAL APPEARANCE:   Middle-aged woman, very pleasant, not in acute distress, tobacco smell.   HEENT: The patient is normocephalic, atraumatic. Pupils are equally reactive to light.  Sclerae are anicteric.  Moist oral mucosa.  Negative pharynx.  No oral thrush.   NECK:  " Supple.  No jugular venous distention.  Thyroid is not palpable.   LYMPH NODES:  Superficial lymphadenopathy is not appreciable in the bilateral cervical, supraclavicular, axillary or inguinal areas.   CARDIOVASCULAR:  S1, S2 regular with no murmurs or gallops.  No carotid or abdominal bruits.   PULMONARY:  Lungs are clear to auscultation and percussion bilaterally.  There is no wheezing or rhonchi.   GASTROINTESTINAL:  Abdomen is soft, nontender.  No hepatosplenomegaly.  No signs of ascites.  No mass appreciable.   MUSCULOSKELETAL/EXTREMITIES:  No edema.  No cyanotic changes.  No signs of joint deformity.  No lymphedema.   NEUROLOGIC:  Cranial nerves II-XII are grossly intact.  Sensation intact.  Muscle strength and muscle tone symmetrical, 5/5 throughout.   BACK:  No spinal or paraspinal tenderness.  No CVA tenderness.   SKIN:  No petechiae.  No rash.  No signs of cellulitis.   BREASTS:  She has well-healed left lumpectomy scar. Left breast with fibrosed skin and protruded much comparing to right side due to lympedema.  She has fibrotic changes around the sentinel scar in her left axilla.  Right breast feels smooth and no skin changes.         Current LAB Data reviewed  CMP is fine   wbc nl, hb 11.5 from 11.8 from  11.0 from 11.7,    from 103 stable  from 102, PL nl.   Marker is nl.       Current Imaging Reviewed  CT chest 1/2019 low does screening - 1. No significant pulmonary nodules are demonstrated.     MA 8/2018 from [facility] - negative.      Old data reviewed with summary  1/2018 dexa - left hip osteopenia.       RS at 55 in 12/2014.     Lumpectomy at the end of 11/2014:  Infiltrating ductal cancer from the left breast, grade 3, associated high-grade DCIS, 0.8 cm.  Angiolymphatic invasion is absent.  ER 81% positive, AZ 14% positive, HER-2/nancy negative.         ASSESSMENT AND PLAN:    1.  pathologic V9iT0L1 early stage high-grade infiltrating ductal cancer with grade ductal carcinoma in situ, status  post lumpectomy.  Due to her high recurrence score of 56, recommended DD AC x 4.  She made informed decision to proceed. She finished it in 3/2015 and had RT 4/2015.     Arimidex is advised in 4/2015. We talked about the side effects and how she should be monitored during this intervention.      She is due 6 months f/u with labs.MA due in July.      2. Smoker. Smoking cessation is advised. She is still using half pack per day.    Advice on going low dose CT screen.     3. Persistent macrocytosis, without evidence of nutrition deficiency.   She is advised to cut down ETOH use. She dose not seem to want to do so.     4. Osteopenia on AI - advice to try fosamax 35 mg po q wk.   She is due repeat dexa in 2020.       Total time spent 40 minutes, more than 25 minutes spent in counseling regarding the above complicated issues, and plan of actions.        Again, thank you for allowing me to participate in the care of your patient.        Sincerely,        Elis Vickers MD, MD

## 2019-07-18 NOTE — PROGRESS NOTES
[facility] COMPLAINT AND REASON FOR VISIT: left breast cancer , [id number] with RS of 56, s/p adjuvant DD AC, s/p RT, now on Arimidex since 2015     HISTORY OF PRESENT ILLNESS: she was diagnosed through screening mammogram with left breast infiltrating ductal cancer in 2014.  Mammogram found a 1 cm density with cluster of irregular calcification in the left breast upper lateral quatrant which is new.  Biopsy was done in mid November through ultrasound.    She subsequently underwent left lumpectomy with Dr. Davison at [facility] at the end of 2014, found infiltrating ductal cancer, grade 3 of 0.8 cm, ER 81% positive, DE 14% positive, HER-2/nancy negative, associated with grade 3 DCIS.  One sentinel node was negative.  She has pathologic T1b N0 disease.   She did not have any breast complaints.  She missed 1-year mammogram in .  Baseline mammogram in  was negative.     Staging in 2014 found no distal disease. She has Z4cB1W6 disease.  RS back at 55 was very high.     DD AC x4 was advised in adjuvant setting. She made informed decision to proceed from  2015 to 3/2015.   She finished RT in 2015.  She is advised on arimidex 2015.      PAST MEDICAL HISTORY:  Hypothyroidism, depression, high cholesterol.  Last menstrual period around .      MEDICATIONS:  Reviewed in Epic system.      SOCIAL HISTORY:  She lives in [place].  Her primary care physician is Dr. Leana Santamaria from [facility].  She is an active smoker, less than a pack a day for 40 years. She is back to work as . She drinks ETOH daily.      FAMILY HISTORY:  Positive for Dad, who had tongue cancer and had metastatic disease,  from that, and Mom probably has had some type of cancer.  She also had dementia.        REVIEW OF SYSTEMS:   Good appetite. Weight is up.   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   She is s/p lympedema therapy.  She continues to smoke and daily ETOH /bear 12 oz bottle.       PHYSICAL  "EXAMINATION:   VITAL SIGNS:  Blood pressure 154/64, pulse 79, temperature 98.6  F (37  C), temperature source Tympanic, resp. rate 18, height 1.695 m (5' 6.75\"), weight 67 kg (147 lb 12.8 oz), SpO2 94 %, not currently breastfeeding.    ECOG 0    GENERAL APPEARANCE:   Middle-aged woman, very pleasant, not in acute distress, tobacco smell.   HEENT: The patient is normocephalic, atraumatic. Pupils are equally reactive to light.  Sclerae are anicteric.  Moist oral mucosa.  Negative pharynx.  No oral thrush.   NECK:  Supple.  No jugular venous distention.  Thyroid is not palpable.   LYMPH NODES:  Superficial lymphadenopathy is not appreciable in the bilateral cervical, supraclavicular, axillary or inguinal areas.   CARDIOVASCULAR:  S1, S2 regular with no murmurs or gallops.  No carotid or abdominal bruits.   PULMONARY:  Lungs are clear to auscultation and percussion bilaterally.  There is no wheezing or rhonchi.   GASTROINTESTINAL:  Abdomen is soft, nontender.  No hepatosplenomegaly.  No signs of ascites.  No mass appreciable.   MUSCULOSKELETAL/EXTREMITIES:  No edema.  No cyanotic changes.  No signs of joint deformity.  No lymphedema.   NEUROLOGIC:  Cranial nerves II-XII are grossly intact.  Sensation intact.  Muscle strength and muscle tone symmetrical, 5/5 throughout.   BACK:  No spinal or paraspinal tenderness.  No CVA tenderness.   SKIN:  No petechiae.  No rash.  No signs of cellulitis.   BREASTS:  She has well-healed left lumpectomy scar. Left breast with fibrosed skin and protruded much comparing to right side due to lympedema.  She has fibrotic changes around the sentinel scar in her left axilla.  Right breast feels smooth and no skin changes.         Current LAB Data reviewed  CMP is fine   wbc nl, hb 11.5 from 11.8 from  11.0 from 11.7,    from 103 stable  from 102, PL nl.   Marker is nl.       Current Imaging Reviewed  CT chest 1/2019 low does screening - 1. No significant pulmonary nodules are " demonstrated.     MA 8/2018 from [facility] - negative.      Old data reviewed with summary  1/2018 dexa - left hip osteopenia.       RS at 55 in 12/2014.     Lumpectomy at the end of 11/2014:  Infiltrating ductal cancer from the left breast, grade 3, associated high-grade DCIS, 0.8 cm.  Angiolymphatic invasion is absent.  ER 81% positive, OR 14% positive, HER-2/nancy negative.         ASSESSMENT AND PLAN:    1.  pathologic T1rS2E1 early stage high-grade infiltrating ductal cancer with grade ductal carcinoma in situ, status post lumpectomy.  Due to her high recurrence score of 56, recommended DD AC x 4.  She made informed decision to proceed. She finished it in 3/2015 and had RT 4/2015.     Arimidex is advised in 4/2015. We talked about the side effects and how she should be monitored during this intervention.      She is due 6 months f/u with labs.MA due in July.      2. Smoker. Smoking cessation is advised. She is still using half pack per day.    Advice on going low dose CT screen.     3. Persistent macrocytosis, without evidence of nutrition deficiency.   She is advised to cut down ETOH use. She dose not seem to want to do so.     4. Osteopenia on AI - advice to try fosamax 35 mg po q wk.   She is due repeat dexa in 2020.       Total time spent 40 minutes, more than 25 minutes spent in counseling regarding the above complicated issues, and plan of actions.

## 2019-07-18 NOTE — PROGRESS NOTES
"Oncology Rooming Note    July 18, 2019 11:12 AM   Gladis Wolfe is a 59 year old female who presents for:    Chief Complaint   Patient presents with     Oncology Clinic Visit     6 month recheck History of left breast cancer, review Labs     Initial Vitals: /64 (BP Location: Right arm, Patient Position: Sitting, Cuff Size: Adult Regular)   Pulse 79   Temp 98.6  F (37  C) (Tympanic)   Resp 18   Ht 1.695 m (5' 6.75\")   Wt 67 kg (147 lb 12.8 oz)   SpO2 94%   Breastfeeding? No   BMI 23.32 kg/m   Estimated body mass index is 23.32 kg/m  as calculated from the following:    Height as of this encounter: 1.695 m (5' 6.75\").    Weight as of this encounter: 67 kg (147 lb 12.8 oz). Body surface area is 1.78 meters squared.  No Pain (0) Comment: Data Unavailable   No LMP recorded. Patient is postmenopausal.  Allergies reviewed: Yes  Medications reviewed: Yes    Medications: Medication refills not needed today.  Pharmacy name entered into [employer]: [facility] - [address]    Clinical concerns: 6 month follow up breast cancer. Review lab results. Overall doing well per patient.       Siomara Goode, KAREN            "

## 2019-09-24 DIAGNOSIS — F17.200 TOBACCO USE DISORDER: ICD-10-CM

## 2019-09-24 DIAGNOSIS — D75.89 MACROCYTOSIS: ICD-10-CM

## 2019-09-24 RX ORDER — ANASTROZOLE 1 MG/1
1 TABLET ORAL DAILY
Qty: 90 TABLET | Refills: 3 | Status: SHIPPED | OUTPATIENT
Start: 2019-09-24 | End: 2020-09-21

## 2019-09-24 NOTE — PROGRESS NOTES
Fax received from [facility] requesting a refill of Anastrazole on behalf of pt.  Last refill: 07.19.18  # 90 with 3 refills at same as above.  Last office visit:  07.18.19  Next office visit:  01.20.20    This is an appropriate refill, and has been e-prescribed. Vicky Wells, RN, BSN, OCN

## 2019-12-08 ENCOUNTER — HEALTH MAINTENANCE LETTER (OUTPATIENT)
Age: 59
End: 2019-12-08

## 2020-01-13 DIAGNOSIS — Z85.3 HISTORY OF LEFT BREAST CANCER: ICD-10-CM

## 2020-01-13 LAB
ALBUMIN SERPL-MCNC: 4.2 G/DL (ref 3.4–5)
ALP SERPL-CCNC: 51 U/L (ref 40–150)
ALT SERPL W P-5'-P-CCNC: 23 U/L (ref 0–50)
ANION GAP SERPL CALCULATED.3IONS-SCNC: 8 MMOL/L (ref 3–14)
AST SERPL W P-5'-P-CCNC: 26 U/L (ref 0–45)
BASOPHILS # BLD AUTO: 0 10E9/L (ref 0–0.2)
BASOPHILS NFR BLD AUTO: 0.2 %
BILIRUB SERPL-MCNC: 0.6 MG/DL (ref 0.2–1.3)
BUN SERPL-MCNC: 14 MG/DL (ref 7–30)
CALCIUM SERPL-MCNC: 9.3 MG/DL (ref 8.5–10.1)
CHLORIDE SERPL-SCNC: 102 MMOL/L (ref 94–109)
CO2 SERPL-SCNC: 25 MMOL/L (ref 20–32)
CREAT SERPL-MCNC: 0.86 MG/DL (ref 0.52–1.04)
DIFFERENTIAL METHOD BLD: ABNORMAL
EOSINOPHIL # BLD AUTO: 0.1 10E9/L (ref 0–0.7)
EOSINOPHIL NFR BLD AUTO: 0.8 %
ERYTHROCYTE [DISTWIDTH] IN BLOOD BY AUTOMATED COUNT: 12.1 % (ref 10–15)
GFR SERPL CREATININE-BSD FRML MDRD: 74 ML/MIN/{1.73_M2}
GLUCOSE SERPL-MCNC: 84 MG/DL (ref 70–99)
HCT VFR BLD AUTO: 35.1 % (ref 35–47)
HGB BLD-MCNC: 11.8 G/DL (ref 11.7–15.7)
LYMPHOCYTES # BLD AUTO: 1.3 10E9/L (ref 0.8–5.3)
LYMPHOCYTES NFR BLD AUTO: 14.1 %
MCH RBC QN AUTO: 34.2 PG (ref 26.5–33)
MCHC RBC AUTO-ENTMCNC: 33.6 G/DL (ref 31.5–36.5)
MCV RBC AUTO: 102 FL (ref 78–100)
MONOCYTES # BLD AUTO: 0.9 10E9/L (ref 0–1.3)
MONOCYTES NFR BLD AUTO: 9.7 %
NEUTROPHILS # BLD AUTO: 7.1 10E9/L (ref 1.6–8.3)
NEUTROPHILS NFR BLD AUTO: 75.2 %
PLATELET # BLD AUTO: 303 10E9/L (ref 150–450)
POTASSIUM SERPL-SCNC: 3.7 MMOL/L (ref 3.4–5.3)
PROT SERPL-MCNC: 8 G/DL (ref 6.8–8.8)
RBC # BLD AUTO: 3.45 10E12/L (ref 3.8–5.2)
SODIUM SERPL-SCNC: 135 MMOL/L (ref 133–144)
WBC # BLD AUTO: 9.5 10E9/L (ref 4–11)

## 2020-01-13 PROCEDURE — 86300 IMMUNOASSAY TUMOR CA 15-3: CPT | Performed by: INTERNAL MEDICINE

## 2020-01-13 PROCEDURE — 36415 COLL VENOUS BLD VENIPUNCTURE: CPT | Performed by: INTERNAL MEDICINE

## 2020-01-13 PROCEDURE — 80053 COMPREHEN METABOLIC PANEL: CPT | Performed by: INTERNAL MEDICINE

## 2020-01-13 PROCEDURE — 85025 COMPLETE CBC W/AUTO DIFF WBC: CPT | Performed by: INTERNAL MEDICINE

## 2020-01-14 LAB — CANCER AG27-29 SERPL-ACNC: 17 U/ML (ref 0–39)

## 2020-01-20 ENCOUNTER — ONCOLOGY VISIT (OUTPATIENT)
Dept: ONCOLOGY | Facility: CLINIC | Age: 60
End: 2020-01-20
Attending: INTERNAL MEDICINE

## 2020-01-20 VITALS
SYSTOLIC BLOOD PRESSURE: 152 MMHG | HEIGHT: 67 IN | HEART RATE: 87 BPM | OXYGEN SATURATION: 96 % | DIASTOLIC BLOOD PRESSURE: 58 MMHG | TEMPERATURE: 98.1 F | BODY MASS INDEX: 22.38 KG/M2 | WEIGHT: 142.6 LBS

## 2020-01-20 DIAGNOSIS — F17.210 NICOTINE DEPENDENCE, CIGARETTES, UNCOMPLICATED: ICD-10-CM

## 2020-01-20 DIAGNOSIS — M85.80 OSTEOPENIA, UNSPECIFIED LOCATION: ICD-10-CM

## 2020-01-20 DIAGNOSIS — Z78.0 MENOPAUSE: ICD-10-CM

## 2020-01-20 DIAGNOSIS — F17.200 TOBACCO USE DISORDER: ICD-10-CM

## 2020-01-20 DIAGNOSIS — D75.89 MACROCYTOSIS: ICD-10-CM

## 2020-01-20 DIAGNOSIS — Z85.3 HISTORY OF LEFT BREAST CANCER: Primary | ICD-10-CM

## 2020-01-20 PROCEDURE — 99215 OFFICE O/P EST HI 40 MIN: CPT | Performed by: INTERNAL MEDICINE

## 2020-01-20 PROCEDURE — G0463 HOSPITAL OUTPT CLINIC VISIT: HCPCS

## 2020-01-20 PROCEDURE — 99406 BEHAV CHNG SMOKING 3-10 MIN: CPT | Performed by: INTERNAL MEDICINE

## 2020-01-20 ASSESSMENT — PAIN SCALES - GENERAL: PAINLEVEL: NO PAIN (0)

## 2020-01-20 ASSESSMENT — MIFFLIN-ST. JEOR: SCORE: 1249.46

## 2020-01-20 NOTE — PROGRESS NOTES
[facility] COMPLAINT AND REASON FOR VISIT:   left breast cancer , [id number] with RS of 56, s/p adjuvant DD AC, s/p RT, now on Arimidex since 2015       HISTORY OF ONCOLOGY ILLNESS: she was diagnosed through screening mammogram with left breast infiltrating ductal cancer in 2014.    She subsequently underwent left lumpectomy with Dr. Davison at [facility] at the end of 2014, found infiltrating ductal cancer, grade 3 of 0.8 cm, ER 81% positive, ME 14% positive, HER-2/nancy negative, associated with grade 3 DCIS.  One sentinel node was negative.  She has pathologic T1b N0 disease.     Staging in 2014 found no distal disease. She has A9nW0W9 disease.  RS back at 55 was very high.     DD AC x4 was advised in adjuvant setting. She made informed decision to proceed from  2015 to 3/2015.   She finished RT in 2015.  She is advised on arimidex 2015.        INTERVAL HISTORY:  Since the patient's last visit with us, there is no hospital stay/surgery/new diagnosis made.  He continues to tolerating oral Arimidex well.    PAST MEDICAL HISTORY:  Hypothyroidism, depression, high cholesterol.  Last menstrual period around .      MEDICATIONS:  Reviewed in Epic system.      SOCIAL HISTORY:  She lives in [place].  Her primary care physician is Dr. Leana Santamaria from [facility].  She is an active smoker, less than a pack a day for 40 years. She is back to work as . She drinks ETOH daily.      FAMILY HISTORY:  Positive for Dad, who had tongue cancer and had metastatic disease,  from that, and Mom probably has had some type of cancer.  She also had dementia.        REVIEW OF SYSTEMS:   Good appetite. Weight is up.   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   She is s/p lympedema therapy.  She continues to smoke some, she is also trying to cut down daily ETOH use.       PHYSICAL EXAMINATION:   VITAL SIGNS:  Blood pressure (!) 152/58, pulse 87, temperature 98.1  F (36.7  C), temperature source  "Tympanic, height 1.702 m (5' 7\"), weight 64.7 kg (142 lb 9.6 oz), SpO2 96 %, not currently breastfeeding.    ECOG 0    GENERAL APPEARANCE:   Middle-aged woman, very pleasant, not in acute distress, tobacco smell.   HEENT: The patient is normocephalic, atraumatic. Pupils are equally reactive to light.  Sclerae are anicteric.  Moist oral mucosa.  Negative pharynx.  No oral thrush.   NECK:  Supple.  No jugular venous distention.  Thyroid is not palpable.   LYMPH NODES:  Superficial lymphadenopathy is not appreciable in the bilateral cervical, supraclavicular, axillary or inguinal areas.   CARDIOVASCULAR:  S1, S2 regular with no murmurs or gallops.  No carotid or abdominal bruits.   PULMONARY:  Lungs are clear to auscultation and percussion bilaterally.  There is no wheezing or rhonchi.   GASTROINTESTINAL:  Abdomen is soft, nontender.  No hepatosplenomegaly.  No signs of ascites.  No mass appreciable.   MUSCULOSKELETAL/EXTREMITIES:  No edema.  No cyanotic changes.  No signs of joint deformity.  No lymphedema.   NEUROLOGIC:  Cranial nerves II-XII are grossly intact.  Sensation intact.  Muscle strength and muscle tone symmetrical, 5/5 throughout.   BACK:  No spinal or paraspinal tenderness.  No CVA tenderness.   SKIN:  No petechiae.  No rash.  No signs of cellulitis.   BREASTS:  She has well-healed left lumpectomy scar. Left breast with fibrosed skin and protruded much comparing to right side due to lympedema.  She has fibrotic changes around the sentinel scar in her left axilla.  Right breast feels smooth and no skin changes.         Current LAB Data reviewed today  CMP is fine   wbc nl, hb 11.5 from 11.8 from  11.0 from 11.7,   MCV  102 from 100 from 103 stable  from 102, PL nl.   Marker is nl.       Current Imaging Reviewed today  12/2019 MA at [facility] - negative      Old data reviewed with summary  CT chest 1/2019 low does screening - 1. No significant pulmonary nodules are demonstrated.     1/2018 dexa - left hip " osteopenia.       RS at 55 in 12/2014.     Lumpectomy at the end of 11/2014:  Infiltrating ductal cancer from the left breast, grade 3, associated high-grade DCIS, 0.8 cm.  Angiolymphatic invasion is absent.  ER 81% positive, IN 14% positive, HER-2/nancy negative.         ASSESSMENT AND PLAN:    1.  pathologic [id number] early stage high-grade infiltrating ductal cancer with grade ductal carcinoma in situ, status post lumpectomy.  Due to her high recurrence score of 56, recommended DD AC x 4.  She made informed decision to proceed. She finished it in 3/2015 and had RT 4/2015.     Arimidex is advised in 4/2015. We talked about the side effects and how she should be monitored during this intervention.      Due to high recurrence score, if her bone can handle AI long-term I think a longer term anti-hormone therapy is meaningful for her.  She is board with this recommendation.    She is due 6 -12 months f/u with labs. MA is done with [facility].     2.  Ongoing smoker.  Spent 5 minutes counseling the patient is on pros and cons and benefits of quit quitting tobacco.  We identified the barries, patient is interested and ready in quitting.  We discussed available resources in assisting this process.  Patient is willing to try cutting down gradually.  Recommend patient to follow-up for any additional help.       3. Persistent macrocytosis, without evidence of nutrition deficiency.   She is advised to cut down ETOH use.   She she is willing to take this advice.    4. Osteopenia on AI.  Advice to try fosamax 35 mg po q wk. so far she tolerates it well.  She is due repeat DEXA scan now.

## 2020-01-20 NOTE — PATIENT INSTRUCTIONS
Dr. Vickers would like you to have a dexa scan and low dose CT scan.     We would like to see you back in 1 year for a follow up appointment with labs prior.     When you are in need of a refill, please call your pharmacy and they will send us a request.      Copy of appointments, and after visit summary (AVS) given to patient.      If you have any questions please call Vicky Wells RN, BSN Oncology Hematology  [facility] [phone]. For questions after business hours, or on holidays/weekends, please call our after hours Nurse Triage line [phone]. Thank you.         dexa and low dose CT to be done.   1 yr follow-up with labs.

## 2020-01-20 NOTE — LETTER
2020         RE: Gladis Wolfe  [address] [facility] [id number]        Dear Colleague,    Thank you for referring your patient, Gladis Wolfe, to the [facility]. Please see a copy of my visit note below.    [facility] COMPLAINT AND REASON FOR VISIT:   left breast cancer , [id number] with RS of 56, s/p adjuvant DD AC, s/p RT, now on Arimidex since 2015       HISTORY OF ONCOLOGY ILLNESS: she was diagnosed through screening mammogram with left breast infiltrating ductal cancer in 2014.    She subsequently underwent left lumpectomy with Dr. Davison at [facility] at the end of 2014, found infiltrating ductal cancer, grade 3 of 0.8 cm, ER 81% positive, NH 14% positive, HER-2/nancy negative, associated with grade 3 DCIS.  One sentinel node was negative.  She has pathologic T1b N0 disease.     Staging in 2014 found no distal disease. She has [id number] disease.  RS back at 55 was very high.     DD AC x4 was advised in adjuvant setting. She made informed decision to proceed from  2015 to 3/2015.   She finished RT in 2015.  She is advised on arimidex 2015.        INTERVAL HISTORY:  Since the patient's last visit with us, there is no hospital stay/surgery/new diagnosis made.  He continues to tolerating oral Arimidex well.    PAST MEDICAL HISTORY:  Hypothyroidism, depression, high cholesterol.  Last menstrual period around .      MEDICATIONS:  Reviewed in Epic system.      SOCIAL HISTORY:  She lives in [place].  Her [facility].  She is an active smoker, less than a pack a day for 40 years. She is back to work as . She drinks ETOH daily.      FAMILY HISTORY:  Positive for Dad, who had tongue cancer and had metastatic disease,  from that, and Mom probably has had some type of cancer.  She also had dementia.        REVIEW OF SYSTEMS:   Good appetite. Weight is up.   Neuropathy is gone on hands, still has it on  "soles.   She is s/p lympedema therapy.  She continues to smoke some, she is also trying to cut down daily ETOH use.       PHYSICAL EXAMINATION:   VITAL SIGNS:  Blood pressure (!) 152/58, pulse 87, temperature 98.1  F (36.7  C), temperature source Tympanic, height 1.702 m (5' 7\"), weight 64.7 kg (142 lb 9.6 oz), SpO2 96 %, not currently breastfeeding.    ECOG 0    GENERAL APPEARANCE:   Middle-aged woman, very pleasant, not in acute distress, tobacco smell.   HEENT: The patient is normocephalic, atraumatic. Pupils are equally reactive to light.  Sclerae are anicteric.  Moist oral mucosa.  Negative pharynx.  No oral thrush.   NECK:  Supple.  No jugular venous distention.  Thyroid is not palpable.   LYMPH NODES:  Superficial lymphadenopathy is not appreciable in the bilateral cervical, supraclavicular, axillary or inguinal areas.   CARDIOVASCULAR:  S1, S2 regular with no murmurs or gallops.  No carotid or abdominal bruits.   PULMONARY:  Lungs are clear to auscultation and percussion bilaterally.  There is no wheezing or rhonchi.   GASTROINTESTINAL:  Abdomen is soft, nontender.  No hepatosplenomegaly.  No signs of ascites.  No mass appreciable.   MUSCULOSKELETAL/EXTREMITIES:  No edema.  No cyanotic changes.  No signs of joint deformity.  No lymphedema.   NEUROLOGIC:  Cranial nerves II-XII are grossly intact.  Sensation intact.  Muscle strength and muscle tone symmetrical, 5/5 throughout.   BACK:  No spinal or paraspinal tenderness.  No CVA tenderness.   SKIN:  No petechiae.  No rash.  No signs of cellulitis.   BREASTS:  She has well-healed left lumpectomy scar. Left breast with fibrosed skin and protruded much comparing to right side due to lympedema.  She has fibrotic changes around the sentinel scar in her left axilla.  Right breast feels smooth and no skin changes.         Current LAB Data reviewed today  CMP is fine   wbc nl, hb 11.5 from 11.8 from  11.0 from 11.7,   MCV  102 from 100 from 103 stable  from 102, PL " nl.   Marker is nl.       Current Imaging Reviewed today  12/2019 MA at [facility] - negative      Old data reviewed with summary  CT chest 1/2019 low does screening - 1. No significant pulmonary nodules are demonstrated.     1/2018 dexa - left hip osteopenia.       RS at 55 in 12/2014.     Lumpectomy at the end of 11/2014:  Infiltrating ductal cancer from the left breast, grade 3, associated high-grade DCIS, 0.8 cm.  Angiolymphatic invasion is absent.  ER 81% positive, CA 14% positive, HER-2/nancy negative.         ASSESSMENT AND PLAN:    1.  pathologic [id number] early stage high-grade infiltrating ductal cancer with grade ductal carcinoma in situ, status post lumpectomy.  Due to her high recurrence score of 56, recommended DD AC x 4.  She made informed decision to proceed. She finished it in 3/2015 and had RT 4/2015.     Arimidex is advised in 4/2015. We talked about the side effects and how she should be monitored during this intervention.      Due to high recurrence score, if her bone can handle AI long-term I think a longer term anti-hormone therapy is meaningful for her.  She is board with this recommendation.    She is due 6 -12 months f/u with labs. MA is done with [facility].     2.  Ongoing smoker.  Spent 5 minutes counseling the patient is on pros and cons and benefits of quit quitting tobacco.  We identified the barries, patient is interested and ready in quitting.  We discussed available resources in assisting this process.  Patient is willing to try cutting down gradually.  Recommend patient to follow-up for any additional help.       3. Persistent macrocytosis, without evidence of nutrition deficiency.   She is advised to cut down ETOH use.   She she is willing to take this advice.    4. Osteopenia on AI.  Advice to try fosamax 35 mg po q wk. so far she tolerates it well.  She is due repeat DEXA scan now.              Again, thank you for allowing me to participate in the care of your patient.         Sincerely,        Elis Vickers MD, MD

## 2020-01-20 NOTE — NURSING NOTE
"Oncology Rooming Note    January 20, 2020 11:19 AM   Gladis Wolfe is a 59 year old female who presents for:    Chief Complaint   Patient presents with     Oncology Clinic Visit     6 month recheck History of left breast cancer, review labs & CT     Initial Vitals: BP (!) 152/58 (BP Location: Right arm, Patient Position: Sitting, Cuff Size: Adult Regular)   Pulse 87   Temp 98.1  F (36.7  C) (Tympanic)   Ht 1.702 m (5' 7\")   Wt 64.7 kg (142 lb 9.6 oz)   SpO2 96%   BMI 22.33 kg/m   Estimated body mass index is 22.33 kg/m  as calculated from the following:    Height as of this encounter: 1.702 m (5' 7\").    Weight as of this encounter: 64.7 kg (142 lb 9.6 oz). Body surface area is 1.75 meters squared.  No Pain (0) Comment: Data Unavailable   No LMP recorded. Patient is postmenopausal.  Allergies reviewed: Yes  Medications reviewed: Yes    Medications: MEDICATION REFILLS NEEDED TODAY. Provider was notified.  Pharmacy name entered into [employer]: [facility] - [place], [place] - [address] [place] [address]    Clinical concerns:  6 month recheck History of left breast cancer, review labs & CT    Patient needs refill on her Fosamax.       Carmen Mosher LPN              "

## 2020-02-11 DIAGNOSIS — M85.80 OSTEOPENIA, UNSPECIFIED LOCATION: ICD-10-CM

## 2020-02-11 RX ORDER — ALENDRONATE SODIUM 35 MG/1
35 TABLET ORAL
Qty: 12 TABLET | Refills: 3 | Status: SHIPPED | OUTPATIENT
Start: 2020-02-11 | End: 2021-01-28

## 2020-03-15 ENCOUNTER — HEALTH MAINTENANCE LETTER (OUTPATIENT)
Age: 60
End: 2020-03-15

## 2020-06-17 ENCOUNTER — PATIENT OUTREACH (OUTPATIENT)
Dept: ONCOLOGY | Facility: CLINIC | Age: 60
End: 2020-06-17

## 2020-06-17 NOTE — PROGRESS NOTES
Called pt to see if she was able to schedule the CT and Dexa scan yet. She was having insurance issue prior.     LM for direct number for pt to call me and for imaging scheduling.     Vicky Wells RN on 6/17/2020 at 10:53 AM

## 2020-09-21 DIAGNOSIS — F17.200 TOBACCO USE DISORDER: ICD-10-CM

## 2020-09-21 DIAGNOSIS — D75.89 MACROCYTOSIS: ICD-10-CM

## 2020-09-21 RX ORDER — ANASTROZOLE 1 MG/1
1 TABLET ORAL DAILY
Qty: 90 TABLET | Refills: 3 | Status: SHIPPED | OUTPATIENT
Start: 2020-09-21 | End: 2021-10-05

## 2021-01-10 ENCOUNTER — HEALTH MAINTENANCE LETTER (OUTPATIENT)
Age: 61
End: 2021-01-10

## 2021-01-28 DIAGNOSIS — M85.80 OSTEOPENIA, UNSPECIFIED LOCATION: ICD-10-CM

## 2021-01-28 RX ORDER — ALENDRONATE SODIUM 35 MG/1
35 TABLET ORAL
Qty: 12 TABLET | Refills: 3 | Status: SHIPPED | OUTPATIENT
Start: 2021-01-28

## 2021-01-28 NOTE — PROGRESS NOTES
Fax received from pharmacy requesting a refill of alendronate on behalf of patient.  Last refill: 2/11/20  # 12 with 3 refills at [facility].  Last office visit:  1/20/20  Next office visit:  Not scheduled     Yakelin Herrera RN

## 2021-10-05 DIAGNOSIS — D75.89 MACROCYTOSIS: ICD-10-CM

## 2021-10-05 DIAGNOSIS — F17.200 TOBACCO USE DISORDER: ICD-10-CM

## 2021-10-05 RX ORDER — ANASTROZOLE 1 MG/1
1 TABLET ORAL DAILY
Qty: 90 TABLET | Refills: 0 | Status: SHIPPED | OUTPATIENT
Start: 2021-10-05 | End: 2022-01-10

## 2021-10-05 NOTE — PROGRESS NOTES
Fax received from [facility] requesting a refill of Anastrazole on behalf of pt.  Last refill: 09/2020  # 90 with 3 refills at [facility].  Last office visit:  1/2020  Next office visit:  Over due - sent message to scheduling and put not on RX to arrange appt for more fills.     This is an appropriate refill, and has been e-prescribed. Vicky Wells RN, BSN, OCN

## 2021-10-23 ENCOUNTER — HEALTH MAINTENANCE LETTER (OUTPATIENT)
Age: 61
End: 2021-10-23

## 2022-01-10 DIAGNOSIS — F17.200 TOBACCO USE DISORDER: ICD-10-CM

## 2022-01-10 DIAGNOSIS — D75.89 MACROCYTOSIS: ICD-10-CM

## 2022-01-10 RX ORDER — ANASTROZOLE 1 MG/1
1 TABLET ORAL DAILY
Qty: 90 TABLET | Refills: 0 | Status: SHIPPED | OUTPATIENT
Start: 2022-01-10

## 2022-02-01 ENCOUNTER — PATIENT OUTREACH (OUTPATIENT)
Dept: ONCOLOGY | Facility: CLINIC | Age: 62
End: 2022-02-01

## 2022-02-01 NOTE — PROGRESS NOTES
Received a refill request for pt (alendronate to [facility]). Pt has not been seen in 2 years and overdue for a dexa scan.     LM for a return call prior to filling.     Vicky Wells RN on 2/1/2022 at 1:17 PM'    Pt returned call and she will arrange a appt - will send refill until that time.     Vicky Wells RN on 2/1/2022 at 2:07 PM     called and pt plans to stay with PCP.     Will send Rx back for PCP to fill.     Vicky Wells RN on 2/1/2022 at 2:45 PM

## 2022-02-12 ENCOUNTER — HEALTH MAINTENANCE LETTER (OUTPATIENT)
Age: 62
End: 2022-02-12

## 2022-10-09 ENCOUNTER — HEALTH MAINTENANCE LETTER (OUTPATIENT)
Age: 62
End: 2022-10-09

## 2023-02-18 ENCOUNTER — HEALTH MAINTENANCE LETTER (OUTPATIENT)
Age: 63
End: 2023-02-18

## 2024-03-16 ENCOUNTER — HEALTH MAINTENANCE LETTER (OUTPATIENT)
Age: 64
End: 2024-03-16

## 2024-08-03 ENCOUNTER — HEALTH MAINTENANCE LETTER (OUTPATIENT)
Age: 64
End: 2024-08-03

## 2024-09-27 ENCOUNTER — PATIENT OUTREACH (OUTPATIENT)
Dept: ONCOLOGY | Facility: CLINIC | Age: 64
End: 2024-09-27

## 2024-09-27 NOTE — PROGRESS NOTES
[facility]: Cancer Care                                                                                          Pt transferred onc care but stated to PATRICK Wells she wanted to see PCP. Removed PATRICK Wells from pt care team.    Signature:  IVONNE SHAW RN

## 2025-03-22 ENCOUNTER — HEALTH MAINTENANCE LETTER (OUTPATIENT)
Age: 65
End: 2025-03-22

## 2025-08-16 ENCOUNTER — HEALTH MAINTENANCE LETTER (OUTPATIENT)
Age: 65
End: 2025-08-16